# Patient Record
Sex: FEMALE | Race: WHITE | NOT HISPANIC OR LATINO | Employment: OTHER | URBAN - METROPOLITAN AREA
[De-identification: names, ages, dates, MRNs, and addresses within clinical notes are randomized per-mention and may not be internally consistent; named-entity substitution may affect disease eponyms.]

---

## 2023-08-09 LAB
ALANINE AMINOTRANSFERASE (SGPT) (U/L) IN SER/PLAS: 12 U/L (ref 7–45)
ALBUMIN (G/DL) IN SER/PLAS: 4.1 G/DL (ref 3.4–5)
ALKALINE PHOSPHATASE (U/L) IN SER/PLAS: 64 U/L (ref 33–136)
ANION GAP IN SER/PLAS: 13 MMOL/L (ref 10–20)
ASPARTATE AMINOTRANSFERASE (SGOT) (U/L) IN SER/PLAS: 19 U/L (ref 9–39)
BASOPHILS (10*3/UL) IN BLOOD BY AUTOMATED COUNT: 0.05 X10E9/L (ref 0–0.1)
BASOPHILS/100 LEUKOCYTES IN BLOOD BY AUTOMATED COUNT: 0.7 % (ref 0–2)
BILIRUBIN TOTAL (MG/DL) IN SER/PLAS: 0.7 MG/DL (ref 0–1.2)
CALCIUM (MG/DL) IN SER/PLAS: 9.8 MG/DL (ref 8.6–10.6)
CARBON DIOXIDE, TOTAL (MMOL/L) IN SER/PLAS: 29 MMOL/L (ref 21–32)
CHLORIDE (MMOL/L) IN SER/PLAS: 105 MMOL/L (ref 98–107)
CHOLESTEROL (MG/DL) IN SER/PLAS: 170 MG/DL (ref 0–199)
CHOLESTEROL IN HDL (MG/DL) IN SER/PLAS: 56.2 MG/DL
CHOLESTEROL/HDL RATIO: 3
CREATININE (MG/DL) IN SER/PLAS: 0.89 MG/DL (ref 0.5–1.05)
EOSINOPHILS (10*3/UL) IN BLOOD BY AUTOMATED COUNT: 0.25 X10E9/L (ref 0–0.7)
EOSINOPHILS/100 LEUKOCYTES IN BLOOD BY AUTOMATED COUNT: 3.4 % (ref 0–6)
ERYTHROCYTE DISTRIBUTION WIDTH (RATIO) BY AUTOMATED COUNT: 12.9 % (ref 11.5–14.5)
ERYTHROCYTE MEAN CORPUSCULAR HEMOGLOBIN CONCENTRATION (G/DL) BY AUTOMATED: 29.9 G/DL (ref 32–36)
ERYTHROCYTE MEAN CORPUSCULAR VOLUME (FL) BY AUTOMATED COUNT: 97 FL (ref 80–100)
ERYTHROCYTES (10*6/UL) IN BLOOD BY AUTOMATED COUNT: 4.07 X10E12/L (ref 4–5.2)
ESTIMATED AVERAGE GLUCOSE FOR HBA1C: 126 MG/DL
GFR FEMALE: 71 ML/MIN/1.73M2
GLUCOSE (MG/DL) IN SER/PLAS: 107 MG/DL (ref 74–99)
HEMATOCRIT (%) IN BLOOD BY AUTOMATED COUNT: 39.5 % (ref 36–46)
HEMOGLOBIN (G/DL) IN BLOOD: 11.8 G/DL (ref 12–16)
HEMOGLOBIN A1C/HEMOGLOBIN TOTAL IN BLOOD: 6 %
IMMATURE GRANULOCYTES/100 LEUKOCYTES IN BLOOD BY AUTOMATED COUNT: 0.3 % (ref 0–0.9)
LDL: 105 MG/DL (ref 0–99)
LEUKOCYTES (10*3/UL) IN BLOOD BY AUTOMATED COUNT: 7.3 X10E9/L (ref 4.4–11.3)
LYMPHOCYTES (10*3/UL) IN BLOOD BY AUTOMATED COUNT: 2.54 X10E9/L (ref 1.2–4.8)
LYMPHOCYTES/100 LEUKOCYTES IN BLOOD BY AUTOMATED COUNT: 34.6 % (ref 13–44)
MONOCYTES (10*3/UL) IN BLOOD BY AUTOMATED COUNT: 0.54 X10E9/L (ref 0.1–1)
MONOCYTES/100 LEUKOCYTES IN BLOOD BY AUTOMATED COUNT: 7.4 % (ref 2–10)
NEUTROPHILS (10*3/UL) IN BLOOD BY AUTOMATED COUNT: 3.94 X10E9/L (ref 1.2–7.7)
NEUTROPHILS/100 LEUKOCYTES IN BLOOD BY AUTOMATED COUNT: 53.6 % (ref 40–80)
NRBC (PER 100 WBCS) BY AUTOMATED COUNT: 0 /100 WBC (ref 0–0)
PLATELETS (10*3/UL) IN BLOOD AUTOMATED COUNT: 367 X10E9/L (ref 150–450)
POTASSIUM (MMOL/L) IN SER/PLAS: 4.1 MMOL/L (ref 3.5–5.3)
PROTEIN TOTAL: 6.7 G/DL (ref 6.4–8.2)
SODIUM (MMOL/L) IN SER/PLAS: 143 MMOL/L (ref 136–145)
TRIGLYCERIDE (MG/DL) IN SER/PLAS: 44 MG/DL (ref 0–149)
UREA NITROGEN (MG/DL) IN SER/PLAS: 20 MG/DL (ref 6–23)
VLDL: 9 MG/DL (ref 0–40)

## 2023-10-01 DIAGNOSIS — K44.9 DIAPHRAGMATIC HERNIA WITHOUT OBSTRUCTION OR GANGRENE: ICD-10-CM

## 2023-10-03 RX ORDER — PANTOPRAZOLE SODIUM 40 MG/1
TABLET, DELAYED RELEASE ORAL
Qty: 90 TABLET | Refills: 0 | Status: SHIPPED | OUTPATIENT
Start: 2023-10-03 | End: 2023-10-30 | Stop reason: SDUPTHER

## 2023-10-28 PROBLEM — R05.3 PERSISTENT COUGH: Status: ACTIVE | Noted: 2023-10-28

## 2023-10-28 PROBLEM — R13.14 DYSPHAGIA, PHARYNGOESOPHAGEAL PHASE: Status: ACTIVE | Noted: 2023-10-28

## 2023-10-28 PROBLEM — F41.8 DEPRESSION WITH ANXIETY: Status: ACTIVE | Noted: 2023-10-28

## 2023-10-28 PROBLEM — K44.9 HIATAL HERNIA: Status: ACTIVE | Noted: 2023-10-28

## 2023-10-28 PROBLEM — R73.09 ABNORMAL GLUCOSE: Status: ACTIVE | Noted: 2023-10-28

## 2023-10-28 PROBLEM — L98.9 SCALP LESION: Status: ACTIVE | Noted: 2023-10-28

## 2023-10-28 PROBLEM — K21.00 GERD WITH ESOPHAGITIS: Status: ACTIVE | Noted: 2023-10-28

## 2023-10-28 PROBLEM — R53.83 FATIGUE: Status: ACTIVE | Noted: 2023-10-28

## 2023-10-28 PROBLEM — H40.9 GLAUCOMA: Status: ACTIVE | Noted: 2023-10-28

## 2023-10-28 PROBLEM — M25.569 KNEE PAIN: Status: ACTIVE | Noted: 2023-10-28

## 2023-10-28 PROBLEM — E66.3 OVERWEIGHT (BMI 25.0-29.9): Status: ACTIVE | Noted: 2023-10-28

## 2023-10-28 PROBLEM — I10 BENIGN ESSENTIAL HYPERTENSION: Status: ACTIVE | Noted: 2023-10-28

## 2023-10-28 RX ORDER — DORZOLAMIDE HYDROCHLORIDE AND TIMOLOL MALEATE 20; 5 MG/ML; MG/ML
SOLUTION/ DROPS OPHTHALMIC
COMMUNITY
Start: 2023-09-25

## 2023-10-28 RX ORDER — FLUOXETINE HYDROCHLORIDE 20 MG/1
20 CAPSULE ORAL DAILY
COMMUNITY
Start: 2023-07-11

## 2023-10-28 RX ORDER — LATANOPROST 50 UG/ML
SOLUTION/ DROPS OPHTHALMIC
COMMUNITY
Start: 2021-03-23 | End: 2024-01-05 | Stop reason: ALTCHOICE

## 2023-10-28 RX ORDER — LATANOPROST 0.05 MG/ML
1 SOLUTION/ DROPS OPHTHALMIC; TOPICAL NIGHTLY
COMMUNITY
End: 2024-02-14 | Stop reason: WASHOUT

## 2023-10-28 RX ORDER — DORZOLAMIDE HYDROCHLORIDE AND TIMOLOL MALEATE PRESERVATIVE FREE 20; 5 MG/ML; MG/ML
1 SOLUTION/ DROPS OPHTHALMIC 2 TIMES DAILY
COMMUNITY
Start: 2017-08-18

## 2023-10-28 RX ORDER — TAFLUPROST OPTHALMIC 0 MG/.3ML
1 SOLUTION/ DROPS OPHTHALMIC EVERY EVENING
COMMUNITY
Start: 2018-01-31 | End: 2024-01-05 | Stop reason: ALTCHOICE

## 2023-10-28 RX ORDER — OFLOXACIN 3 MG/ML
SOLUTION/ DROPS OPHTHALMIC
COMMUNITY
Start: 2023-08-07 | End: 2024-01-05 | Stop reason: ALTCHOICE

## 2023-10-28 RX ORDER — AMLODIPINE AND BENAZEPRIL HYDROCHLORIDE 5; 20 MG/1; MG/1
1 CAPSULE ORAL DAILY
COMMUNITY
Start: 2019-04-24 | End: 2023-12-24

## 2023-10-28 RX ORDER — DESVENLAFAXINE SUCCINATE 50 MG/1
50 TABLET, EXTENDED RELEASE ORAL DAILY
COMMUNITY
Start: 2023-09-03

## 2023-10-28 RX ORDER — BROMFENAC SODIUM 0.7 MG/ML
SOLUTION/ DROPS OPHTHALMIC
COMMUNITY
Start: 2023-06-28

## 2023-10-28 RX ORDER — BESIFLOXACIN 6 MG/ML
SUSPENSION OPHTHALMIC
COMMUNITY
Start: 2023-06-28

## 2023-10-28 RX ORDER — DICLOFENAC SODIUM 1 MG/ML
SOLUTION/ DROPS OPHTHALMIC
COMMUNITY
Start: 2023-08-07

## 2023-10-28 RX ORDER — BENZONATATE 200 MG/1
200 CAPSULE ORAL
COMMUNITY
Start: 2023-01-04 | End: 2024-01-05 | Stop reason: ALTCHOICE

## 2023-10-28 RX ORDER — PREDNISOLONE ACETATE 10 MG/ML
SUSPENSION/ DROPS OPHTHALMIC
COMMUNITY
Start: 2023-08-07 | End: 2024-01-05 | Stop reason: ALTCHOICE

## 2023-10-30 ENCOUNTER — OFFICE VISIT (OUTPATIENT)
Dept: GASTROENTEROLOGY | Facility: CLINIC | Age: 68
End: 2023-10-30
Payer: MEDICARE

## 2023-10-30 VITALS — HEIGHT: 64 IN | WEIGHT: 156 LBS | BODY MASS INDEX: 26.63 KG/M2 | HEART RATE: 105 BPM | OXYGEN SATURATION: 97 %

## 2023-10-30 DIAGNOSIS — K21.00 GASTROESOPHAGEAL REFLUX DISEASE WITH ESOPHAGITIS WITHOUT HEMORRHAGE: Primary | ICD-10-CM

## 2023-10-30 DIAGNOSIS — K44.9 DIAPHRAGMATIC HERNIA WITHOUT OBSTRUCTION OR GANGRENE: ICD-10-CM

## 2023-10-30 DIAGNOSIS — Z12.11 COLON CANCER SCREENING: ICD-10-CM

## 2023-10-30 DIAGNOSIS — R05.3 PERSISTENT COUGH: ICD-10-CM

## 2023-10-30 PROCEDURE — 1159F MED LIST DOCD IN RCRD: CPT | Performed by: INTERNAL MEDICINE

## 2023-10-30 PROCEDURE — 1126F AMNT PAIN NOTED NONE PRSNT: CPT | Performed by: INTERNAL MEDICINE

## 2023-10-30 PROCEDURE — 99213 OFFICE O/P EST LOW 20 MIN: CPT | Performed by: INTERNAL MEDICINE

## 2023-10-30 RX ORDER — PANTOPRAZOLE SODIUM 40 MG/1
40 TABLET, DELAYED RELEASE ORAL
Qty: 180 TABLET | Refills: 1 | Status: SHIPPED | OUTPATIENT
Start: 2023-10-30 | End: 2024-04-03

## 2023-10-30 ASSESSMENT — ENCOUNTER SYMPTOMS
LOSS OF SENSATION IN FEET: 0
OCCASIONAL FEELINGS OF UNSTEADINESS: 0
DEPRESSION: 0

## 2023-10-30 NOTE — PROGRESS NOTES
Subjective     History of Present Illness:     67 yo with HTN, depression/anxiety, GERD seen in follow up.  Last seen 1 yr ago  for cough with PO intake with negative MBS and no additional sx.    EGD 9/2022 showed LA grade B esophagitis and 4 cm hiatal hernia.  She was started on pantoprazole 40 mg daily.  States pantoprazole helped a little. Still coughing quite a bit at times not reulated to meals.  Has history of asthma, only uses rescue inhaler but has not seen pulmonology in many years.  No dysphagia, odynophagia or globus sensation. No regurgitation  Denies heartburn, indigestion, nausea or epigastric pain. No increase in belching.  No rectal bleeding or melena  Bms regular.     Colonoscopy ([facility]) 5 y ago [place], was normal; per patient due for repeat colonoscpy per primary.   No reports available for review.     No family history of Gi issues.     Allergies  No Known Allergies    Medications       Objective   There were no vitals taken for this visit.   Physical Exam  Vitals reviewed.   Constitutional:       General: She is awake.   Cardiovascular:      Rate and Rhythm: Normal rate and regular rhythm.   Pulmonary:      Effort: Pulmonary effort is normal.      Breath sounds: Normal breath sounds.   Abdominal:      General: Bowel sounds are normal.      Palpations: Abdomen is soft.      Tenderness: There is no abdominal tenderness.   Neurological:      Mental Status: She is alert and oriented to person, place, and time.   Psychiatric:         Attention and Perception: Attention and perception normal.         Behavior: Behavior normal.             Asssessment/Plan:    67 yo with HTN, depression/anxiety, GERD seen in follow up. Mild improvement in cough on once daily PPI.  Esophagitis and hiatal hernia on EGD.  Will increase PPI to bid x 2 mo to assess for further improvement, however recommend pulmonary evaluation as well.  If no significant change on bid ppi will need objective pH testing to eavluate  reflux    Rec  Bid ppi x 2 mo  Recommend pulmonary eval  Colonoscopy scheduled    F/up in 2 mo       Ngoc Buck MD

## 2023-11-22 ENCOUNTER — APPOINTMENT (OUTPATIENT)
Dept: RADIOLOGY | Facility: CLINIC | Age: 68
End: 2023-11-22
Payer: MEDICARE

## 2023-12-05 ENCOUNTER — APPOINTMENT (OUTPATIENT)
Dept: RADIOLOGY | Facility: CLINIC | Age: 68
End: 2023-12-05
Payer: MEDICARE

## 2023-12-21 DIAGNOSIS — I10 ESSENTIAL (PRIMARY) HYPERTENSION: ICD-10-CM

## 2023-12-24 RX ORDER — AMLODIPINE AND BENAZEPRIL HYDROCHLORIDE 5; 20 MG/1; MG/1
1 CAPSULE ORAL DAILY
Qty: 90 CAPSULE | Refills: 1 | Status: SHIPPED | OUTPATIENT
Start: 2023-12-24 | End: 2024-02-14 | Stop reason: SDUPTHER

## 2024-01-04 ENCOUNTER — ANCILLARY PROCEDURE (OUTPATIENT)
Dept: RADIOLOGY | Facility: CLINIC | Age: 69
End: 2024-01-04
Payer: MEDICARE

## 2024-01-04 DIAGNOSIS — Z12.31 ENCOUNTER FOR SCREENING MAMMOGRAM FOR MALIGNANT NEOPLASM OF BREAST: ICD-10-CM

## 2024-01-04 PROCEDURE — 77067 SCR MAMMO BI INCL CAD: CPT | Mod: BILATERAL PROCEDURE | Performed by: RADIOLOGY

## 2024-01-04 PROCEDURE — 77067 SCR MAMMO BI INCL CAD: CPT

## 2024-01-04 PROCEDURE — 77063 BREAST TOMOSYNTHESIS BI: CPT | Mod: BILATERAL PROCEDURE | Performed by: RADIOLOGY

## 2024-01-05 ENCOUNTER — OFFICE VISIT (OUTPATIENT)
Dept: GASTROENTEROLOGY | Facility: CLINIC | Age: 69
End: 2024-01-05
Payer: MEDICARE

## 2024-01-05 VITALS — HEIGHT: 64 IN | WEIGHT: 149.4 LBS | BODY MASS INDEX: 25.51 KG/M2

## 2024-01-05 DIAGNOSIS — K21.00 GASTROESOPHAGEAL REFLUX DISEASE WITH ESOPHAGITIS WITHOUT HEMORRHAGE: ICD-10-CM

## 2024-01-05 DIAGNOSIS — K44.9 HIATAL HERNIA: Primary | ICD-10-CM

## 2024-01-05 PROCEDURE — 1159F MED LIST DOCD IN RCRD: CPT | Performed by: INTERNAL MEDICINE

## 2024-01-05 PROCEDURE — 1036F TOBACCO NON-USER: CPT | Performed by: INTERNAL MEDICINE

## 2024-01-05 PROCEDURE — 1126F AMNT PAIN NOTED NONE PRSNT: CPT | Performed by: INTERNAL MEDICINE

## 2024-01-05 PROCEDURE — 99213 OFFICE O/P EST LOW 20 MIN: CPT | Performed by: INTERNAL MEDICINE

## 2024-01-05 NOTE — PROGRESS NOTES
Subjective     History of Present Illness:   67 yo with HTN, depression/anxiety, GERD seen in follow up.  Last seen 10/30 . Had been on pantoprazole after EGD in 9/22 showed LA grade B esophagitis, 4 cm hiatal hernia.   Helped cough a little bit. Increased pantoprazole to 40 mg bid last visit and today reports resolution of cough. Has made significant difference on BID pantoprazole.  Denies dysphagia/odynophagia. No complaints.   Scheduled for surveillance colonoscopy next week.     EGD 9/2022 showed LA grade B esophagitis and 4 cm hiatal hernia.  Colonoscopy ([facility]) 5 y ago at [place], was normal; per patient due for repeat colonoscpy per primary.   No reports available for review.     No family history of Gi issues.       Allergies  No Known Allergies    Medications  Current Outpatient Medications   Medication Instructions    amLODIPine-benazepriL (Lotrel) 5-20 mg capsule 1 capsule, oral, Daily    benzonatate (TESSALON) 200 mg, oral, EVERY 8 HOURS FOR COUGH AS NEEDED    besifloxacin (Besivance) 0.6 % drops,suspension  Use 1 Drop in the left eye three times daily. STARTING AFTER SURGERY<BR>    bromfenac (Prolensa) 0.07 % drops  Use 1 Drop in the left eye once daily. STARTING AFTER SURGERY    desvenlafaxine (PRISTIQ) 50 mg, oral, Daily    diclofenac (Voltaren) 0.1 % ophthalmic solution     dorzolamide-timoloL (Cosopt) 22.3-6.8 mg/mL ophthalmic solution     dorzolamide-timolol, PF, (Cosopt) 2-0.5 % ophthalmic solution 1 drop, ophthalmic (eye), 2 times daily    FLUoxetine (PROZAC) 20 mg, oral, Daily    latanoprost (Xalatan) 0.005 % ophthalmic solution Latanoprost 0.005 % Ophthalmic Solution   Quantity: 8  Refills: 0        Start : 23-Mar-2021   Active    ofloxacin (Ocuflox) 0.3 % ophthalmic solution     pantoprazole (PROTONIX) 40 mg, oral, 2 times daily before meals, Do not crush, chew, or split.    prednisoLONE acetate (Pred-Forte) 1 % ophthalmic suspension     tafluprost, PF, (Zioptan, PF,) 0.0015 % dropperette 1  drop, ophthalmic (eye), Every evening    Xelpros 0.005 % drops, emulsion 1 drop, ophthalmic (eye), Nightly        Objective   There were no vitals taken for this visit.   Physical Exam          Assessment/Plan:    Expand All Collapse All       Subjective   History of Present Illness:      65 yo with HTN, depression/anxiety, GERD seen in follow up.  Last seen 1 yr ago  for cough with PO intake with negative MBS and no additional sx.    EGD 9/2022 showed LA grade B esophagitis and 4 cm hiatal hernia.  She was started on pantoprazole 40 mg daily.  States pantoprazole helped a little. Still coughing quite a bit at times not reulated to meals.  Has history of asthma, only uses rescue inhaler but has not seen pulmonology in many years.  No dysphagia, odynophagia or globus sensation. No regurgitation  Denies heartburn, indigestion, nausea or epigastric pain. No increase in belching.  No rectal bleeding or melena  Bms regular.     Colonoscopy ([facility]) 5 y ago at [place], was normal; per patient due for repeat colonoscpy per primary.   No reports available for review.     No family history of Gi issues.      Allergies  No Known Allergies     Medications              Objective   There were no vitals taken for this visit.   Physical Exam  Vitals reviewed.   Constitutional:       General: She is awake.   Cardiovascular:      Rate and Rhythm: Normal rate and regular rhythm.   Pulmonary:      Effort: Pulmonary effort is normal.      Breath sounds: Normal breath sounds.   Abdominal:      General: Bowel sounds are normal.      Palpations: Abdomen is soft.      Tenderness: There is no abdominal tenderness.   Neurological:      Mental Status: She is alert and oriented to person, place, and time.   Psychiatric:         Attention and Perception: Attention and perception normal.         Behavior: Behavior normal.                  Asssessment/Plan:     69 yo with HTN, depression/anxiety, GERD seen in follow up for gerd, chronic cough.  Esophagitis and hiatal hernia on EGD.  Increased PPI to BID and has had essentially resolution of cough confirming reflux as cause.  Encouraged to continue current dosing and reassured of safety of PPIs. Colonoscopy for surveillance next week.     Rec  Cont Bid ppi   Colonoscopy scheduled next week          Ngoc Buck MD

## 2024-01-12 ENCOUNTER — OFFICE VISIT (OUTPATIENT)
Dept: GASTROENTEROLOGY | Facility: EXTERNAL LOCATION | Age: 69
End: 2024-01-12
Payer: MEDICARE

## 2024-01-12 ENCOUNTER — LAB REQUISITION (OUTPATIENT)
Dept: LAB | Facility: HOSPITAL | Age: 69
End: 2024-01-12
Payer: MEDICARE

## 2024-01-12 DIAGNOSIS — Z12.11 COLON CANCER SCREENING: ICD-10-CM

## 2024-01-12 DIAGNOSIS — K57.30 DIVERTICULOSIS OF LARGE INTESTINE WITHOUT DIVERTICULITIS: Primary | ICD-10-CM

## 2024-01-12 DIAGNOSIS — D12.5 BENIGN NEOPLASM OF SIGMOID COLON: ICD-10-CM

## 2024-01-12 DIAGNOSIS — D12.3 BENIGN NEOPLASM OF TRANSVERSE COLON: ICD-10-CM

## 2024-01-12 PROCEDURE — 1159F MED LIST DOCD IN RCRD: CPT | Performed by: INTERNAL MEDICINE

## 2024-01-12 PROCEDURE — 88341 IMHCHEM/IMCYTCHM EA ADD ANTB: CPT

## 2024-01-12 PROCEDURE — 88305 TISSUE EXAM BY PATHOLOGIST: CPT

## 2024-01-12 PROCEDURE — 45385 COLONOSCOPY W/LESION REMOVAL: CPT | Performed by: INTERNAL MEDICINE

## 2024-01-12 PROCEDURE — 88305 TISSUE EXAM BY PATHOLOGIST: CPT | Performed by: PATHOLOGY

## 2024-01-12 PROCEDURE — 0753T DGTZ GLS MCRSCP SLD LEVEL IV: CPT

## 2024-01-12 PROCEDURE — 88342 IMHCHEM/IMCYTCHM 1ST ANTB: CPT | Performed by: PATHOLOGY

## 2024-01-12 PROCEDURE — 1126F AMNT PAIN NOTED NONE PRSNT: CPT | Performed by: INTERNAL MEDICINE

## 2024-01-12 PROCEDURE — 88342 IMHCHEM/IMCYTCHM 1ST ANTB: CPT

## 2024-01-12 PROCEDURE — 88341 IMHCHEM/IMCYTCHM EA ADD ANTB: CPT | Performed by: PATHOLOGY

## 2024-01-12 PROCEDURE — 1036F TOBACCO NON-USER: CPT | Performed by: INTERNAL MEDICINE

## 2024-01-12 PROCEDURE — 0760T DGTZ GLS MCRSCP SL IMM 1ST: CPT

## 2024-01-22 ENCOUNTER — CLINICAL SUPPORT (OUTPATIENT)
Dept: AUDIOLOGY | Facility: CLINIC | Age: 69
End: 2024-01-22
Payer: MEDICARE

## 2024-01-22 DIAGNOSIS — H90.3 SENSORINEURAL HEARING LOSS (SNHL) OF BOTH EARS: Primary | ICD-10-CM

## 2024-01-22 PROCEDURE — 92557 COMPREHENSIVE HEARING TEST: CPT | Performed by: AUDIOLOGIST

## 2024-01-22 PROCEDURE — 92550 TYMPANOMETRY & REFLEX THRESH: CPT | Performed by: AUDIOLOGIST

## 2024-01-22 ASSESSMENT — PAIN - FUNCTIONAL ASSESSMENT: PAIN_FUNCTIONAL_ASSESSMENT: 0-10

## 2024-01-22 ASSESSMENT — PAIN SCALES - GENERAL: PAINLEVEL_OUTOF10: 0 - NO PAIN

## 2024-01-22 NOTE — PROGRESS NOTES
AUDIOLOGY ADULT AUDIOMETRIC EVALUATION    Name:  Karli Ferrera  :  1955  Age:  68 y.o.  Date of Evaluation:  24  Time: 1430 - 1515    History:  Ms. Ferrera is seen today for audiogram and hearing aid check.  Last seen by me a little over a year ago for CI eval. She was not a CI candidate.    CNC words (55 dB HL in quiet)  Right: 78%  Left: 68%     AZBio sentences (55 dB HL in quiet)  Right: 96%  Left: 96%     AZBio sentences (55 dB HL in noise, +5 dB SNR, noise presented from front speaker)  Right: 30%  Left: 50%     I ended up dispensing new hearing aids at the time.  She found great benefit from them.    Phonak Crystaleo P50-R   Right s/n: [id number]  Left s/n: [id number]  Warranty expiration 3/4/2026     SlimTip, size 2 medium power .   Right s/n: [id number]  Left s/n: [id number]  Warranty expiration: 3/4/2023    Today  - would like hearing aids adjusted as she is struggling a little more to hear.  She has no concerns otherwise about the function of her devices.  - thinks her hearing is still stable  - No report of Otalgia, Otorrhea, Aural Fullness, Imbalance / Dizziness, Tinnitus, Noise Exposure, Previous ear surgeries    Recall (2022)    - Bilateral hearing loss with use of bilateral hearing aids the last 15 years. Has felt that her hearing / word understanding has gotten worse  - Uses Audibel TRICIA hearing aids fit by an outside clinic. Aids are 3 years old, per patient report. Did not have a past audiogram with her.   - Family hx of hearing loss (3/4 siblings with hearing difficulty and parents as well)  - She went to a educational seminar on cochlear implants sponsored by [employer] and led by Melissa Velazquez (she saw a flyer in the "[employer]," magazine) and was subsequently interested in learning more.   - No report of Otalgia, Otorrhea, Aural Fullness, Imbalance / Dizziness, Tinnitus, Noise Exposure, Previous ear surgeries    RESULTS:    Otoscopic Evaluation: clear bilaterally      Immittance Measures: 226 Hz    Tympanograms   - Right ear Normal middle ear pressure, normal compliance, and normal volume (Type A)  - Left ear: Normal middle ear pressure, normal compliance, and normal volume (Type A)    Acoustic reflexes (Ipsilateral)  - Right ear: [x] 500 Hz [x] 1000 Hz [] 2000 Hz [] 4000 Hz  - Left ear:   [] 500 Hz [] 1000 Hz [] 2000 Hz [] 4000 Hz.    Test technique:  Pure Tone Audiometry     Reliability:   good    Pure Tone Audiometry:  - moderate to moderately-severe sensorineural hearing loss bilaterally     Speech Audiometry:   - Right ear: 80 % at 95 dB HL, recorded NU6 words  - Left ear: 80 % at 95 dB HL, recorded NU6 words    HEARING AID CHECK  - cleaned and checked hearing aids  - updated hearing aid firmware  - ran feedback manager; enabled over-tuning; increased overall gain bilaterally.  Confirmed there was no feedback.   She was happy with adjustments.    IMPRESSIONS/RECOMMENDATIONS:  - Stable moderate to moderately-severe sensorineural hearing loss bilaterally   - Annual audiogram to monitor hearing, or sooner if there are concerns re: hearing. This was scheduled 1/20/2025.   - Reminded patient of hearing aid warranty period         Viral Patsy Luong, PhD  Audiologist /  of Otolaryngology    01/22/24        11/15/2022

## 2024-01-23 LAB
LAB AP ASR DISCLAIMER: NORMAL
LABORATORY COMMENT REPORT: NORMAL
PATH REPORT.FINAL DX SPEC: NORMAL
PATH REPORT.GROSS SPEC: NORMAL
PATH REPORT.RELEVANT HX SPEC: NORMAL
PATH REPORT.TOTAL CANCER: NORMAL

## 2024-02-01 ENCOUNTER — LAB (OUTPATIENT)
Dept: LAB | Facility: LAB | Age: 69
End: 2024-02-01
Payer: MEDICARE

## 2024-02-01 DIAGNOSIS — R73.09 OTHER ABNORMAL GLUCOSE: ICD-10-CM

## 2024-02-01 DIAGNOSIS — I10 ESSENTIAL (PRIMARY) HYPERTENSION: Primary | ICD-10-CM

## 2024-02-01 LAB
ALBUMIN SERPL BCP-MCNC: 4 G/DL (ref 3.4–5)
ALP SERPL-CCNC: 63 U/L (ref 33–136)
ALT SERPL W P-5'-P-CCNC: 12 U/L (ref 7–45)
ANION GAP SERPL CALC-SCNC: 11 MMOL/L (ref 10–20)
AST SERPL W P-5'-P-CCNC: 17 U/L (ref 9–39)
BASOPHILS # BLD AUTO: 0.05 X10*3/UL (ref 0–0.1)
BASOPHILS NFR BLD AUTO: 0.7 %
BILIRUB SERPL-MCNC: 0.5 MG/DL (ref 0–1.2)
BUN SERPL-MCNC: 17 MG/DL (ref 6–23)
CALCIUM SERPL-MCNC: 9.6 MG/DL (ref 8.6–10.6)
CHLORIDE SERPL-SCNC: 106 MMOL/L (ref 98–107)
CO2 SERPL-SCNC: 30 MMOL/L (ref 21–32)
CREAT SERPL-MCNC: 0.95 MG/DL (ref 0.5–1.05)
EGFRCR SERPLBLD CKD-EPI 2021: 65 ML/MIN/1.73M*2
EOSINOPHIL # BLD AUTO: 0.26 X10*3/UL (ref 0–0.7)
EOSINOPHIL NFR BLD AUTO: 3.6 %
ERYTHROCYTE [DISTWIDTH] IN BLOOD BY AUTOMATED COUNT: 12.6 % (ref 11.5–14.5)
EST. AVERAGE GLUCOSE BLD GHB EST-MCNC: 123 MG/DL
GLUCOSE SERPL-MCNC: 95 MG/DL (ref 74–99)
HBA1C MFR BLD: 5.9 %
HCT VFR BLD AUTO: 39 % (ref 36–46)
HGB BLD-MCNC: 12 G/DL (ref 12–16)
IMM GRANULOCYTES # BLD AUTO: 0.02 X10*3/UL (ref 0–0.7)
IMM GRANULOCYTES NFR BLD AUTO: 0.3 % (ref 0–0.9)
LYMPHOCYTES # BLD AUTO: 2.38 X10*3/UL (ref 1.2–4.8)
LYMPHOCYTES NFR BLD AUTO: 33 %
MCH RBC QN AUTO: 29.2 PG (ref 26–34)
MCHC RBC AUTO-ENTMCNC: 30.8 G/DL (ref 32–36)
MCV RBC AUTO: 95 FL (ref 80–100)
MONOCYTES # BLD AUTO: 0.48 X10*3/UL (ref 0.1–1)
MONOCYTES NFR BLD AUTO: 6.6 %
NEUTROPHILS # BLD AUTO: 4.03 X10*3/UL (ref 1.2–7.7)
NEUTROPHILS NFR BLD AUTO: 55.8 %
NRBC BLD-RTO: 0 /100 WBCS (ref 0–0)
PLATELET # BLD AUTO: 351 X10*3/UL (ref 150–450)
POTASSIUM SERPL-SCNC: 4.2 MMOL/L (ref 3.5–5.3)
PROT SERPL-MCNC: 6.6 G/DL (ref 6.4–8.2)
RBC # BLD AUTO: 4.11 X10*6/UL (ref 4–5.2)
SODIUM SERPL-SCNC: 143 MMOL/L (ref 136–145)
WBC # BLD AUTO: 7.2 X10*3/UL (ref 4.4–11.3)

## 2024-02-01 PROCEDURE — 80053 COMPREHEN METABOLIC PANEL: CPT

## 2024-02-01 PROCEDURE — 85025 COMPLETE CBC W/AUTO DIFF WBC: CPT

## 2024-02-01 PROCEDURE — 83036 HEMOGLOBIN GLYCOSYLATED A1C: CPT

## 2024-02-01 PROCEDURE — 36415 COLL VENOUS BLD VENIPUNCTURE: CPT

## 2024-02-08 ENCOUNTER — OFFICE VISIT (OUTPATIENT)
Dept: OBSTETRICS AND GYNECOLOGY | Facility: CLINIC | Age: 69
End: 2024-02-08
Payer: MEDICARE

## 2024-02-08 VITALS
WEIGHT: 150 LBS | HEIGHT: 65 IN | BODY MASS INDEX: 24.99 KG/M2 | DIASTOLIC BLOOD PRESSURE: 78 MMHG | SYSTOLIC BLOOD PRESSURE: 153 MMHG

## 2024-02-08 DIAGNOSIS — Z01.419 NORMAL GYNECOLOGIC EXAMINATION: ICD-10-CM

## 2024-02-08 DIAGNOSIS — Z01.419 ENCOUNTER FOR WELL WOMAN EXAM WITH ROUTINE GYNECOLOGICAL EXAM: Primary | ICD-10-CM

## 2024-02-08 PROCEDURE — 88142 CYTOPATH C/V THIN LAYER: CPT

## 2024-02-08 PROCEDURE — 1159F MED LIST DOCD IN RCRD: CPT | Performed by: OBSTETRICS & GYNECOLOGY

## 2024-02-08 PROCEDURE — 87624 HPV HI-RISK TYP POOLED RSLT: CPT

## 2024-02-08 PROCEDURE — 1126F AMNT PAIN NOTED NONE PRSNT: CPT | Performed by: OBSTETRICS & GYNECOLOGY

## 2024-02-08 PROCEDURE — 1160F RVW MEDS BY RX/DR IN RCRD: CPT | Performed by: OBSTETRICS & GYNECOLOGY

## 2024-02-08 PROCEDURE — 1036F TOBACCO NON-USER: CPT | Performed by: OBSTETRICS & GYNECOLOGY

## 2024-02-08 PROCEDURE — 99397 PER PM REEVAL EST PAT 65+ YR: CPT | Performed by: OBSTETRICS & GYNECOLOGY

## 2024-02-08 PROCEDURE — 3077F SYST BP >= 140 MM HG: CPT | Performed by: OBSTETRICS & GYNECOLOGY

## 2024-02-08 PROCEDURE — 3078F DIAST BP <80 MM HG: CPT | Performed by: OBSTETRICS & GYNECOLOGY

## 2024-02-08 ASSESSMENT — ENCOUNTER SYMPTOMS
HEMATOLOGIC/LYMPHATIC NEGATIVE: 1
ENDOCRINE NEGATIVE: 1
CARDIOVASCULAR NEGATIVE: 1
CONSTITUTIONAL NEGATIVE: 1
PSYCHIATRIC NEGATIVE: 1
RESPIRATORY NEGATIVE: 1
ALLERGIC/IMMUNOLOGIC NEGATIVE: 1
EYES NEGATIVE: 1
NEUROLOGICAL NEGATIVE: 1
GASTROINTESTINAL NEGATIVE: 1
MUSCULOSKELETAL NEGATIVE: 1

## 2024-02-08 ASSESSMENT — PAIN SCALES - GENERAL: PAINLEVEL: 0-NO PAIN

## 2024-02-08 NOTE — PROGRESS NOTES
Subjective   Patient ID: Karli Ferrera is a 68 y.o. female who presents for Annual Exam (Last pap:  (age)/Last chloe:  8/10/2023  cat 1/Last colon screen:  1/12/2024/[facility].   Emilie Carl LPN).  HPI patient is here for annual visit she has no complaints    Review of Systems   Constitutional: Negative.    Eyes: Negative.    Respiratory: Negative.     Cardiovascular: Negative.    Gastrointestinal: Negative.    Endocrine: Negative.    Genitourinary: Negative.    Musculoskeletal: Negative.    Skin: Negative.    Allergic/Immunologic: Negative.    Neurological: Negative.    Hematological: Negative.    Psychiatric/Behavioral: Negative.         Objective   Physical Exam  Constitutional:       Appearance: Normal appearance.   HENT:      Head: Normocephalic and atraumatic.   Cardiovascular:      Rate and Rhythm: Normal rate and regular rhythm.      Pulses: Normal pulses.      Heart sounds: Normal heart sounds.   Pulmonary:      Effort: Pulmonary effort is normal.      Breath sounds: Normal breath sounds.   Abdominal:      General: Abdomen is flat. Bowel sounds are normal.      Palpations: Abdomen is soft.      Hernia: There is no hernia in the left inguinal area or right inguinal area.   Genitourinary:     General: Normal vulva.      Exam position: Lithotomy position.      Labia:         Right: No rash, tenderness or lesion.         Left: No rash, tenderness or lesion.       Urethra: No prolapse.      Vagina: Normal.      Cervix: Normal.      Uterus: Normal.       Adnexa: Right adnexa normal and left adnexa normal.   Musculoskeletal:      Cervical back: Normal range of motion and neck supple.   Skin:     General: Skin is warm and dry.   Neurological:      General: No focal deficit present.      Mental Status: She is alert and oriented to person, place, and time.         Assessment/Plan    normal gynecologic examination  Pap test HPV typing       Jesse Reddy MD 02/08/24 5:12 PM

## 2024-02-14 ENCOUNTER — OFFICE VISIT (OUTPATIENT)
Dept: PRIMARY CARE | Facility: CLINIC | Age: 69
End: 2024-02-14
Payer: MEDICARE

## 2024-02-14 VITALS
SYSTOLIC BLOOD PRESSURE: 160 MMHG | DIASTOLIC BLOOD PRESSURE: 77 MMHG | HEART RATE: 91 BPM | BODY MASS INDEX: 25.67 KG/M2 | HEIGHT: 64 IN | TEMPERATURE: 97.1 F | OXYGEN SATURATION: 95 % | WEIGHT: 150.35 LBS | RESPIRATION RATE: 16 BRPM

## 2024-02-14 DIAGNOSIS — R73.09 ABNORMAL GLUCOSE: ICD-10-CM

## 2024-02-14 DIAGNOSIS — Z00.00 MEDICARE ANNUAL WELLNESS VISIT, SUBSEQUENT: Primary | ICD-10-CM

## 2024-02-14 DIAGNOSIS — I10 BENIGN ESSENTIAL HYPERTENSION: ICD-10-CM

## 2024-02-14 DIAGNOSIS — I10 ESSENTIAL (PRIMARY) HYPERTENSION: ICD-10-CM

## 2024-02-14 PROCEDURE — 1126F AMNT PAIN NOTED NONE PRSNT: CPT | Performed by: INTERNAL MEDICINE

## 2024-02-14 PROCEDURE — 1036F TOBACCO NON-USER: CPT | Performed by: INTERNAL MEDICINE

## 2024-02-14 PROCEDURE — 99213 OFFICE O/P EST LOW 20 MIN: CPT | Performed by: INTERNAL MEDICINE

## 2024-02-14 PROCEDURE — 99397 PER PM REEVAL EST PAT 65+ YR: CPT | Performed by: INTERNAL MEDICINE

## 2024-02-14 PROCEDURE — 1159F MED LIST DOCD IN RCRD: CPT | Performed by: INTERNAL MEDICINE

## 2024-02-14 PROCEDURE — 1160F RVW MEDS BY RX/DR IN RCRD: CPT | Performed by: INTERNAL MEDICINE

## 2024-02-14 PROCEDURE — 1170F FXNL STATUS ASSESSED: CPT | Performed by: INTERNAL MEDICINE

## 2024-02-14 PROCEDURE — 3078F DIAST BP <80 MM HG: CPT | Performed by: INTERNAL MEDICINE

## 2024-02-14 PROCEDURE — 3077F SYST BP >= 140 MM HG: CPT | Performed by: INTERNAL MEDICINE

## 2024-02-14 RX ORDER — AMLODIPINE AND BENAZEPRIL HYDROCHLORIDE 5; 20 MG/1; MG/1
1 CAPSULE ORAL DAILY
Qty: 90 CAPSULE | Refills: 1 | Status: SHIPPED | OUTPATIENT
Start: 2024-02-14

## 2024-02-14 ASSESSMENT — ACTIVITIES OF DAILY LIVING (ADL)
MANAGING_FINANCES: INDEPENDENT
DOING_HOUSEWORK: INDEPENDENT
GROCERY_SHOPPING: INDEPENDENT
DRESSING: INDEPENDENT
BATHING: INDEPENDENT
TAKING_MEDICATION: INDEPENDENT

## 2024-02-14 ASSESSMENT — ENCOUNTER SYMPTOMS
APPETITE CHANGE: 0
DIZZINESS: 0
LOSS OF SENSATION IN FEET: 0
SEIZURES: 0
NUMBNESS: 0
FATIGUE: 0
SPEECH DIFFICULTY: 0
CHILLS: 0
FLANK PAIN: 0
ARTHRALGIAS: 0
POLYPHAGIA: 0
OCCASIONAL FEELINGS OF UNSTEADINESS: 0
BRUISES/BLEEDS EASILY: 0
HEADACHES: 0
TREMORS: 0
DEPRESSION: 0
HEMATURIA: 0
DIAPHORESIS: 0
PALPITATIONS: 0
STRIDOR: 0
WEAKNESS: 0
PHOTOPHOBIA: 0
BACK PAIN: 0
COUGH: 0
DYSURIA: 0
ADENOPATHY: 0

## 2024-02-14 ASSESSMENT — PATIENT HEALTH QUESTIONNAIRE - PHQ9
SUM OF ALL RESPONSES TO PHQ9 QUESTIONS 1 AND 2: 0
1. LITTLE INTEREST OR PLEASURE IN DOING THINGS: NOT AT ALL
2. FEELING DOWN, DEPRESSED OR HOPELESS: NOT AT ALL

## 2024-02-14 ASSESSMENT — PAIN SCALES - GENERAL: PAINLEVEL: 0-NO PAIN

## 2024-02-14 NOTE — PATIENT INSTRUCTIONS
Advised compliance with antihypertensive medication.   the prescription from pharmacy was sent today.  Follow low-sodium diet.  Follow-up in 6 months

## 2024-02-14 NOTE — ASSESSMENT & PLAN NOTE
Hypertension : not  controlled blood pressure. Out of medication. Continues same medication and educate a low salt diet do not exceed 200 mg per serving. Keep monitor the blood pressure at home. Refill medication.

## 2024-02-14 NOTE — ASSESSMENT & PLAN NOTE
No recent hospitalizations.    All medications reviewed and reconciled by me the provider..  No use of controlled substances or opiates.    Family history, social history reviewed, no changes.    Patient does not smoke.    Patient does not drink.    Patient hydrates adequately daily.  Eats a well-balanced healthy diet.     Does not exercise regularly.    Patient denies any difficulty with memory or cognition.     Hearing loss, she has hearing aids    No fall risk.  No recent falls.  Denies any difficulty walking.    Patient with no history of depression anxiety, denies any loss of interest, no feeling of sadness, no lack of motivation.    Patient is independent in all ADLs and IADLs.  Independent bathing, dressing, walking.  Takes care of own finances, shopping and cooking.     End-of-life decision-making power of  reviewed with patient.

## 2024-02-14 NOTE — PROGRESS NOTES
"Subjective   Patient ID: Karli Ferrera is a 68 y.o. female who presents for Medicare Annual Wellness Visit Subsequent.    Subsequent subsequent Medicare wellness visit.  She has hypertension, history of depression, prediabetes, GERD.  She has had bilateral eyes cataract surgery in December.  She is out of antihypertensive medication for the past 3 days.  Blood pressure is high today.  She has had blood work and results were reviewed.         Review of Systems   Constitutional:  Negative for appetite change, chills, diaphoresis and fatigue.   HENT:  Negative for congestion, ear discharge, hearing loss, mouth sores and postnasal drip.    Eyes:  Negative for photophobia and visual disturbance.   Respiratory:  Negative for cough and stridor.    Cardiovascular:  Negative for palpitations and leg swelling.   Endocrine: Negative for cold intolerance, heat intolerance, polyphagia and polyuria.   Genitourinary:  Negative for decreased urine volume, dysuria, enuresis, flank pain and hematuria.   Musculoskeletal:  Negative for arthralgias, back pain and gait problem.   Allergic/Immunologic: Negative for food allergies and immunocompromised state.   Neurological:  Negative for dizziness, tremors, seizures, syncope, speech difficulty, weakness, numbness and headaches.   Hematological:  Negative for adenopathy. Does not bruise/bleed easily.       Objective   /77 (BP Location: Left arm, Patient Position: Sitting)   Pulse 91   Temp 36.2 °C (97.1 °F) (Temporal)   Resp 16   Ht 1.621 m (5' 3.82\")   Wt 68.2 kg (150 lb 5.7 oz)   SpO2 95%   BMI 25.95 kg/m²     Physical Exam  Constitutional:       Appearance: Normal appearance.   HENT:      Head: Normocephalic and atraumatic.      Right Ear: External ear normal.      Left Ear: External ear normal.      Mouth/Throat:      Mouth: Mucous membranes are moist.      Pharynx: Oropharynx is clear.   Neck:      Vascular: No carotid bruit.   Cardiovascular:      Rate and Rhythm: " Normal rate and regular rhythm.      Heart sounds: No murmur heard.     No gallop.   Pulmonary:      Effort: Pulmonary effort is normal. No respiratory distress.      Breath sounds: No wheezing or rales.   Abdominal:      General: Abdomen is flat.      Palpations: Abdomen is soft.      Hernia: No hernia is present.   Musculoskeletal:         General: No swelling, tenderness, deformity or signs of injury.      Cervical back: No rigidity or tenderness.      Right lower leg: No edema.   Lymphadenopathy:      Cervical: No cervical adenopathy.   Skin:     Coloration: Skin is not jaundiced or pale.      Findings: No bruising, erythema, lesion or rash.   Neurological:      General: No focal deficit present.      Mental Status: She is oriented to person, place, and time.      Motor: No weakness.      Coordination: Coordination normal.   Psychiatric:         Mood and Affect: Mood normal.         Behavior: Behavior normal.         Assessment/Plan   Problem List Items Addressed This Visit             ICD-10-CM    Abnormal glucose R73.09     HbA1c 5.9. Avoid sweets.         Benign essential hypertension I10     Hypertension : not  controlled blood pressure. Out of medication. Continues same medication and educate a low salt diet do not exceed 200 mg per serving. Keep monitor the blood pressure at home. Refill medication.           Medicare annual wellness visit, subsequent - Primary Z00.00     No recent hospitalizations.    All medications reviewed and reconciled by me the provider..  No use of controlled substances or opiates.    Family history, social history reviewed, no changes.    Patient does not smoke.    Patient does not drink.    Patient hydrates adequately daily.  Eats a well-balanced healthy diet.     Does not exercise regularly.    Patient denies any difficulty with memory or cognition.     Hearing loss, she has hearing aids    No fall risk.  No recent falls.  Denies any difficulty walking.    Patient with no  history of depression anxiety, denies any loss of interest, no feeling of sadness, no lack of motivation.    Patient is independent in all ADLs and IADLs.  Independent bathing, dressing, walking.  Takes care of own finances, shopping and cooking.     End-of-life decision-making power of  reviewed with patient.            Other Visit Diagnoses         Codes    Essential (primary) hypertension     I10    Relevant Medications    amLODIPine-benazepriL (Lotrel) 5-20 mg capsule

## 2024-02-25 LAB
CYTOLOGY CMNT CVX/VAG CYTO-IMP: NORMAL
HPV HR 12 DNA GENITAL QL NAA+PROBE: NEGATIVE
HPV HR GENOTYPES PNL CVX NAA+PROBE: NEGATIVE
HPV16 DNA SPEC QL NAA+PROBE: NEGATIVE
HPV18 DNA SPEC QL NAA+PROBE: NEGATIVE
LAB AP HPV GENOTYPE QUESTION: YES
LAB AP HPV HR: NORMAL
LABORATORY COMMENT REPORT: NORMAL
LABORATORY COMMENT REPORT: NORMAL
MENSTRUAL HX REPORTED: NORMAL
PATH REPORT.TOTAL CANCER: NORMAL

## 2024-04-03 DIAGNOSIS — K44.9 DIAPHRAGMATIC HERNIA WITHOUT OBSTRUCTION OR GANGRENE: ICD-10-CM

## 2024-04-03 RX ORDER — PANTOPRAZOLE SODIUM 40 MG/1
40 TABLET, DELAYED RELEASE ORAL
Qty: 180 TABLET | Refills: 1 | Status: SHIPPED | OUTPATIENT
Start: 2024-04-03

## 2024-08-13 DIAGNOSIS — K44.9 DIAPHRAGMATIC HERNIA WITHOUT OBSTRUCTION OR GANGRENE: ICD-10-CM

## 2024-08-13 RX ORDER — PANTOPRAZOLE SODIUM 40 MG/1
40 TABLET, DELAYED RELEASE ORAL
Qty: 180 TABLET | Refills: 0 | Status: SHIPPED | OUTPATIENT
Start: 2024-08-13

## 2024-08-15 ENCOUNTER — APPOINTMENT (OUTPATIENT)
Dept: PRIMARY CARE | Facility: CLINIC | Age: 69
End: 2024-08-15
Payer: MEDICARE

## 2024-08-15 VITALS
BODY MASS INDEX: 27.36 KG/M2 | SYSTOLIC BLOOD PRESSURE: 120 MMHG | DIASTOLIC BLOOD PRESSURE: 80 MMHG | RESPIRATION RATE: 16 BRPM | OXYGEN SATURATION: 96 % | HEART RATE: 90 BPM | TEMPERATURE: 97.3 F | WEIGHT: 158.51 LBS

## 2024-08-15 DIAGNOSIS — I10 BENIGN ESSENTIAL HYPERTENSION: Primary | ICD-10-CM

## 2024-08-15 DIAGNOSIS — R73.09 ABNORMAL GLUCOSE: ICD-10-CM

## 2024-08-15 DIAGNOSIS — Z12.31 ENCOUNTER FOR SCREENING MAMMOGRAM FOR BREAST CANCER: ICD-10-CM

## 2024-08-15 DIAGNOSIS — Z78.0 ASYMPTOMATIC MENOPAUSAL STATE: ICD-10-CM

## 2024-08-15 DIAGNOSIS — Z00.00 MEDICARE ANNUAL WELLNESS VISIT, SUBSEQUENT: ICD-10-CM

## 2024-08-15 PROCEDURE — 1160F RVW MEDS BY RX/DR IN RCRD: CPT | Performed by: INTERNAL MEDICINE

## 2024-08-15 PROCEDURE — 3079F DIAST BP 80-89 MM HG: CPT | Performed by: INTERNAL MEDICINE

## 2024-08-15 PROCEDURE — 99213 OFFICE O/P EST LOW 20 MIN: CPT | Performed by: INTERNAL MEDICINE

## 2024-08-15 PROCEDURE — 3074F SYST BP LT 130 MM HG: CPT | Performed by: INTERNAL MEDICINE

## 2024-08-15 PROCEDURE — 1159F MED LIST DOCD IN RCRD: CPT | Performed by: INTERNAL MEDICINE

## 2024-08-15 PROCEDURE — 1036F TOBACCO NON-USER: CPT | Performed by: INTERNAL MEDICINE

## 2024-08-15 PROCEDURE — 1126F AMNT PAIN NOTED NONE PRSNT: CPT | Performed by: INTERNAL MEDICINE

## 2024-08-15 PROCEDURE — 1123F ACP DISCUSS/DSCN MKR DOCD: CPT | Performed by: INTERNAL MEDICINE

## 2024-08-15 RX ORDER — ALPRAZOLAM 0.5 MG/1
TABLET ORAL
COMMUNITY
Start: 2024-04-09

## 2024-08-15 RX ORDER — ERYTHROMYCIN 5 MG/G
1 OINTMENT OPHTHALMIC 4 TIMES DAILY
COMMUNITY
Start: 2024-04-25 | End: 2024-08-15 | Stop reason: ALTCHOICE

## 2024-08-15 ASSESSMENT — ENCOUNTER SYMPTOMS
OCCASIONAL FEELINGS OF UNSTEADINESS: 0
WEAKNESS: 0
TREMORS: 0
ARTHRALGIAS: 0
BACK PAIN: 0
APPETITE CHANGE: 0
DIZZINESS: 0
LOSS OF SENSATION IN FEET: 0
STRIDOR: 0
HEADACHES: 0
CHILLS: 0
HYPERTENSION: 1
PALPITATIONS: 0
SEIZURES: 0
NUMBNESS: 0
DYSURIA: 0
FLANK PAIN: 0
POLYPHAGIA: 0
FATIGUE: 0
COUGH: 0
DEPRESSION: 0
ADENOPATHY: 0
HEMATURIA: 0
DIAPHORESIS: 0
PHOTOPHOBIA: 0
BRUISES/BLEEDS EASILY: 0
SPEECH DIFFICULTY: 0

## 2024-08-15 ASSESSMENT — PATIENT HEALTH QUESTIONNAIRE - PHQ9
2. FEELING DOWN, DEPRESSED OR HOPELESS: NOT AT ALL
1. LITTLE INTEREST OR PLEASURE IN DOING THINGS: NOT AT ALL
SUM OF ALL RESPONSES TO PHQ9 QUESTIONS 1 AND 2: 0

## 2024-08-15 ASSESSMENT — PAIN SCALES - GENERAL: PAINLEVEL: 0-NO PAIN

## 2024-08-15 NOTE — ASSESSMENT & PLAN NOTE
Hypertension : controlled blood pressure , continue same medication and educate a low salt diet do not exceed 200 mg per serving. Keep monitor the blood pressure at home.

## 2024-08-15 NOTE — ASSESSMENT & PLAN NOTE
Please hemoglobin A1c 5.9.  Made aware of risk of diabetes.  Follow low carbohydrate diet avoid rice potatoes pasta sweets.  Check hemoglobin A1c before next visit.

## 2024-08-15 NOTE — PROGRESS NOTES
Subjective   Patient ID: Karli Ferrera is a 68 y.o. female who presents for Hypertension.    Follow-up visit.  She has hypertension depression with anxiety, GERD glaucoma, abnormal fasting glucose.  She feels well has no complaints.    Hypertension  Pertinent negatives include no headaches or palpitations.        Review of Systems   Constitutional:  Negative for appetite change, chills, diaphoresis and fatigue.   HENT:  Positive for hearing loss. Negative for congestion, ear discharge, mouth sores and postnasal drip.    Eyes:  Negative for photophobia and visual disturbance.   Respiratory:  Negative for cough and stridor.    Cardiovascular:  Negative for palpitations and leg swelling.   Endocrine: Negative for cold intolerance, heat intolerance, polyphagia and polyuria.   Genitourinary:  Negative for decreased urine volume, dysuria, enuresis, flank pain and hematuria.   Musculoskeletal:  Negative for arthralgias, back pain and gait problem.   Allergic/Immunologic: Negative for food allergies and immunocompromised state.   Neurological:  Negative for dizziness, tremors, seizures, syncope, speech difficulty, weakness, numbness and headaches.   Hematological:  Negative for adenopathy. Does not bruise/bleed easily.       Objective   /80 (BP Location: Left arm, Patient Position: Sitting)   Pulse 90   Temp 36.3 °C (97.3 °F) (Temporal)   Resp 16   Wt 71.9 kg (158 lb 8.2 oz)   SpO2 96%   BMI 27.36 kg/m²     Physical Exam  Constitutional:       Appearance: Normal appearance.   HENT:      Head: Normocephalic and atraumatic.      Comments: Hearing aids.     Right Ear: External ear normal.      Left Ear: External ear normal.      Mouth/Throat:      Mouth: Mucous membranes are moist.      Pharynx: Oropharynx is clear.   Neck:      Vascular: No carotid bruit.   Cardiovascular:      Rate and Rhythm: Normal rate and regular rhythm.      Heart sounds: No murmur heard.     No gallop.   Pulmonary:      Effort: Pulmonary  effort is normal. No respiratory distress.      Breath sounds: No wheezing or rales.   Abdominal:      General: Abdomen is flat.      Palpations: Abdomen is soft.      Hernia: No hernia is present.   Musculoskeletal:         General: No swelling, tenderness, deformity or signs of injury.      Cervical back: No rigidity or tenderness.      Right lower leg: No edema.   Lymphadenopathy:      Cervical: No cervical adenopathy.   Skin:     Coloration: Skin is not jaundiced or pale.      Findings: No bruising, erythema, lesion or rash.   Neurological:      General: No focal deficit present.      Mental Status: She is oriented to person, place, and time.      Motor: No weakness.      Coordination: Coordination normal.   Psychiatric:         Mood and Affect: Mood normal.         Behavior: Behavior normal.         Assessment/Plan   Problem List Items Addressed This Visit             ICD-10-CM    Abnormal glucose R73.09     Please hemoglobin A1c 5.9.  Made aware of risk of diabetes.  Follow low carbohydrate diet avoid rice potatoes pasta sweets.  Check hemoglobin A1c before next visit.         Benign essential hypertension - Primary I10     Hypertension : controlled blood pressure , continue same medication and educate a low salt diet do not exceed 200 mg per serving. Keep monitor the blood pressure at home.           Medicare annual wellness visit, subsequent Z00.00    Relevant Orders    Follow Up In Primary Care - Medicare Annual     Other Visit Diagnoses         Codes    Asymptomatic menopausal state     Z78.0    Relevant Orders    XR DEXA bone density    Encounter for screening mammogram for breast cancer     Z12.31    Relevant Orders    BI mammo bilateral screening tomosynthesis

## 2024-09-16 ENCOUNTER — CLINICAL SUPPORT (OUTPATIENT)
Dept: AUDIOLOGY | Facility: CLINIC | Age: 69
End: 2024-09-16
Payer: MEDICARE

## 2024-09-16 DIAGNOSIS — H90.3 SENSORINEURAL HEARING LOSS (SNHL) OF BOTH EARS: Primary | ICD-10-CM

## 2024-09-16 PROCEDURE — V5299 HEARING SERVICE: HCPCS | Mod: AUDSP

## 2024-09-16 NOTE — PROGRESS NOTES
HEARING AID CHECK    Karli Ferrera was seen today for a hearing aid check. Patient reports that she recently traveled and lost her hearing aid  block. She has tried to use other  blocks, but they only charged the devices up for one day and then stopped working. She reports that her devices and  are otherwise working fine.    Previous audiologic testing completed on 1/22/24 showed moderate to moderately-severe sensorineural hearing loss with good word recognition bilaterally.     HEARING AID INFORMATION     Right Left   Date of Fitting 1/16/2023 1/16/2023   [facility]    Phonak Phonak   Model Audeo P50-R Audeo P50-R   Serial Number [id number]   Style TRICIA TRICIA   Warranty (Repair & L/D) 3/4/2026 3/4/2026   /Dome 2MP, slim tip earmold (SN: [id number], warranty exp: 3/4/23) 2MP, slip tip earmold (SN: [id number], warranty exp: 3/4/23)       ACTIONS TAKEN:  Cleaning:  brushed microphone ports, brushed  ports on earmolds. Wax guards appear free of debris. Devices were charged slightly today during cleaning.  Listening check= good binaural  Provided patient with new charging block from spare clinic stock. A new block was requested from [facility] to refill clinic stock.    RECOMMENDATIONS:  -Hearing aids provided back to patient in good working condition.  -Full time use of hearing aids during all waking and dry hours.  -Return as needed for hearing aid checks.  -Annual hearing evaluation.      Patient billed minor hearing aid fee in EPIC. Charge ticket was faxed.       Patsy Carvalho, CCC-A  Clinical Audiologist    Time: [id number]

## 2024-10-25 ENCOUNTER — HOSPITAL ENCOUNTER (OUTPATIENT)
Dept: RADIOLOGY | Facility: CLINIC | Age: 69
Discharge: HOME | End: 2024-10-25
Payer: MEDICARE

## 2024-10-25 DIAGNOSIS — Z78.0 ASYMPTOMATIC MENOPAUSAL STATE: ICD-10-CM

## 2024-10-25 PROCEDURE — 77080 DXA BONE DENSITY AXIAL: CPT

## 2024-11-08 DIAGNOSIS — I10 ESSENTIAL (PRIMARY) HYPERTENSION: ICD-10-CM

## 2024-11-08 RX ORDER — AMLODIPINE AND BENAZEPRIL HYDROCHLORIDE 5; 20 MG/1; MG/1
1 CAPSULE ORAL DAILY
Qty: 90 CAPSULE | Refills: 1 | Status: SHIPPED | OUTPATIENT
Start: 2024-11-08

## 2024-12-08 DIAGNOSIS — K44.9 DIAPHRAGMATIC HERNIA WITHOUT OBSTRUCTION OR GANGRENE: ICD-10-CM

## 2024-12-09 RX ORDER — PANTOPRAZOLE SODIUM 40 MG/1
40 TABLET, DELAYED RELEASE ORAL
Qty: 180 TABLET | Refills: 0 | Status: SHIPPED | OUTPATIENT
Start: 2024-12-09

## 2025-01-15 NOTE — PROGRESS NOTES
ADULT AUDIOLOGY EVALUATION    Name:  Karli Ferrera  :  1955  Age:  69 y.o.  Date of Evaluation:  2025     IMPRESSIONS     Otoscopy and screening tympanometry are consistent with occluding cerumen in the left ear. Otoscopy in the right ear demonstrated clear canal with intact tympanic membrane.    RECOMMENDATIONS     Schedule with ENT for cerumen removal in the left ear. The ENT department can be reached at [phone].  Following cerumen removal, return for hearing evaluation.    Time: 9341-6001    HISTORY     Karli Ferrera is seen today for an audiologic evaluation. Previous audiologic testing on 24 demonstrates moderate to moderately-severe sensorineural hearing loss in both ears. Word recognition was measured to be good in both ears at this time. Patient was fit with binaural Phonak Audeo P50-R TRICIA hearing aids through this clinic on 23. These devices are currently within their  warranty (exp: 3/4/2026).     HEARING AID INFORMATION       Right Left   Date of Fitting 2023   Clinic [facility]    Phonak Phonak   Model Audeo P50-R Audeo P50-R   Serial Number [id number]   Style TRICIA TRICIA   Warranty (Repair & L/D) 3/4/2026 3/4/2026   /Dome 2MP, slim tip earmold (SN: [id number], warranty exp: 3/4/23) 2MP, slip tip earmold (SN: [id number], warranty exp: 3/4/23)     Today, patient reports that her hearing seems to be significantly worse since her last evaluation. She has to turn the TV volume up very high in order to hear it, even with her hearing aids in. Besides insufficient volume, patient reports that her hearing aids have been working well. Patient denied tinnitus, dizziness, aural fullness, recent ear infections, otalgia, otorrhea, or history of otologic surgeries.      TEST RESULTS     Otoscopic Evaluation:  Right Ear: Clear ear canal with unremarkable tympanic membrane  Left Ear: Complete cerumen occlusion without view of  tympanic membrane    Screening Tympanometry:   Right Ear: Did not test.  Left Ear: Flat, type B tympanogram with small ear canal volume, no peak pressure or compliance, consistent with complete cerumen occlusion.      No charges associated with this encounter.    Patsy Carvalho, CCC-A  Clinical Audiologist    Degree of Hearing Sensitivity Decibel Range   Within Normal Limits (WNL) 0-25   Mild 26-40   Moderate 41-55   Moderately-Severe 56-70   Severe 71-90   Profound 91+      Key   CNT/DNT Could Not Test/Did Not Test   TM Tympanic Membrane   WNL Within Normal Limits   HA Hearing Aid   SNHL Sensorineural Hearing Loss   CHL Conductive Hearing Loss   NIHL Noise-Induced Hearing Loss   ECV Ear Canal Volume   MLV Monitored Live Voice     AUDIOGRAM

## 2025-01-20 ENCOUNTER — CLINICAL SUPPORT (OUTPATIENT)
Dept: AUDIOLOGY | Facility: CLINIC | Age: 70
End: 2025-01-20
Payer: MEDICARE

## 2025-01-20 DIAGNOSIS — H61.22 IMPACTED CERUMEN OF LEFT EAR: Primary | ICD-10-CM

## 2025-01-21 ENCOUNTER — TELEPHONE (OUTPATIENT)
Dept: PRIMARY CARE | Facility: CLINIC | Age: 70
End: 2025-01-21

## 2025-01-22 ENCOUNTER — OFFICE VISIT (OUTPATIENT)
Dept: PRIMARY CARE | Facility: CLINIC | Age: 70
End: 2025-01-22
Payer: MEDICARE

## 2025-01-22 DIAGNOSIS — H61.23 EXCESSIVE WAX IN BOTH EARS: Primary | ICD-10-CM

## 2025-01-22 PROCEDURE — 1123F ACP DISCUSS/DSCN MKR DOCD: CPT | Performed by: INTERNAL MEDICINE

## 2025-01-22 PROCEDURE — 99212 OFFICE O/P EST SF 10 MIN: CPT | Performed by: INTERNAL MEDICINE

## 2025-01-22 PROCEDURE — 1036F TOBACCO NON-USER: CPT | Performed by: INTERNAL MEDICINE

## 2025-01-22 PROCEDURE — G2211 COMPLEX E/M VISIT ADD ON: HCPCS | Performed by: INTERNAL MEDICINE

## 2025-01-22 PROCEDURE — 1160F RVW MEDS BY RX/DR IN RCRD: CPT | Performed by: INTERNAL MEDICINE

## 2025-01-22 PROCEDURE — 1159F MED LIST DOCD IN RCRD: CPT | Performed by: INTERNAL MEDICINE

## 2025-01-22 NOTE — ASSESSMENT & PLAN NOTE
After instilling hydrogen peroxide in both ears they were lavaged with warm water and wax was removed with the help of curette also.

## 2025-01-22 NOTE — PROGRESS NOTES
Subjective   Patient ID: Karli Ferrera is a 69 y.o. female who presents for EAR CLEANING (Bilateral).    HPI     Review of Systems   HENT:          Ears wax       Objective   There were no vitals taken for this visit.    Physical Exam  HENT:      Head:      Comments: Bilateral ears wax        Assessment/Plan   Problem List Items Addressed This Visit             ICD-10-CM    Excessive wax in both ears - Primary H61.23     After instilling hydrogen peroxide in both ears they were lavaged with warm water and wax was removed with the help of curette also.

## 2025-01-28 NOTE — PROGRESS NOTES
ADULT AUDIOLOGY EVALUATION    Name:  Karli Ferrera  :  1955  Age:  69 y.o.  Date of Evaluation:  2025     IMPRESSIONS     Today's test results indicate normal middle ear functioning in the right ear, and excessive negative middle ear pressure in the left ear. Audiometry shows moderate to moderately-severe sensorineural hearing loss in the right ear, and moderate to severe sensorineural hearing loss in the left ear. Word recognition is good in both ears. Binaural speech-in-noise testing demonstrated moderate difficulty understanding speech in the presence of background noise. Compared to previous audiologic evaluation on 24, hearing thresholds are largely stable, with slight progression observed at 500, 1000, and 8000 Hz in the left ear.     RECOMMENDATIONS     Continue medical follow up with PCP.  Consistent use of binaural hearing aids.  Return as needed for hearing aid checks and maintenance.  Return for annual hearing evaluation or sooner should new concerns arise. Patient was made aware of excessive negative middle ear pressure in the left ear and encouraged to follow up with this department if she begins to notice left sided fullness or decreased hearing abilities on this side.    Time: 9280-1896    HISTORY     Karli Ferrear is seen today for an audiologic evaluation in conjunction with otolaryngology. Patient was recently seen for testing on 25, however full testing was unable to be completed at this visit due to patient having excessive cerumen in the left ear. Previous audiologic testing on 24 demonstrates moderate to moderately-severe sensorineural hearing loss in both ears. Word recognition was measured to be good in both ears at this time. Patient was fit with binaural Phonak Meteo-Logiceo P50-R TRICIA hearing aids through this clinic on 23. These devices are currently within their  warranty (exp: 3/4/2026). Of note, hearing aids were briefly cleaned and checked at  previous visit on 1/20/25 and found to be in good working condition.     HEARING AID INFORMATION       Right Left   Date of Fitting 1/16/2023 1/16/2023   Clinic [facility]    Phonak Phonak   Model Audeo P50-R Audeo P50-R   Serial Number [id number]   [facility] TRICIA [facility]   Warranty (Repair & L/D) 3/4/2026 3/4/2026   /Dome 2MP, slim tip earmold (SN: [id number], warranty exp: 3/4/23) 2MP, slip tip earmold (SN: [id number], warranty exp: 3/4/23)      Case history obtained on 1/20/25:  Patient reports that her hearing seems to be significantly worse since her last evaluation. She has to turn the TV volume up very high in order to hear it, even with her hearing aids in. Besides insufficient volume, patient reports that her hearing aids have been working well. Patient denied tinnitus, dizziness, aural fullness, recent ear infections, otalgia, otorrhea, or history of otologic surgeries.    Today, patient reports that she recently had cerumen removed by her primary care provider. She feels that this helped her hearing significantly compared to her last visit. Karli denied any new concerns regarding her ears or hearing. She noted having recent congestion due to a cold.      TEST RESULTS     Otoscopic Evaluation:  Right Ear: Clear ear canal with unremarkable tympanic membrane  Left Ear: Non-occluding cerumen in ear canal with partial visualization of tympanic membrane    Tympanometry:   Right Ear: Normal, type A tympanogram with normal ear canal volume, peak pressure and compliance.   Left Ear: Negative, type C tympanogram with normal ear canal volume, negative peak pressure, and normal compliance.     Ipsilateral Acoustic Reflexes:   Right Ear: Could not test due to inability to maintain seal.   Left Ear: Could not test due to inability to maintain seal.     Pure Tone Audiometry (125-8000 Hz):     Right Ear: Moderate sensorineural hearing loss from 125-500 Hz, and moderately-severe from 8916-9995  Hz.    Left Ear: Borderline moderately-severe to moderate from 125-500 Hz, falling to moderately-severe from 4351-9031 Hz, and severe at 8000 Hz.    Speech Audiometry:   Right Ear:    Speech Reception Threshold (SRT) was obtained at 55 dBHL using recorded material.   Word Recognition scores were good (84%) in quiet when words were presented at 90 dBHL using recorded NU-6 word list ordered by difficulty.  Most Comfortable Level (MCL) was found to be 90 dBHL.  Left Ear:    Speech Reception Threshold (SRT) was obtained at 65 dBHL using recorded material.   Word Recognition scores were good (84%) in quiet when words were presented at 90 dBHL using recorded NU-6 word list ordered by difficulty.   Most Comfortable Level (MCL) was found to be 90 dBHL.    QuickSIN:  One QuickSIN list was delivered binaurally at 90 dBHL  Binaural: SNR loss of 11.5 (moderate)    Previous Test:  Right ear: Hearing thresholds are largely stable compared to previous test from 1/22/24.  Left ear: Hearing thresholds are largely stable compared to previous test from 1/22/24. Slight (10 dB) decline was observed at 500, 1000, and 8000 Hz.        Patsy Carvalho, CCC-A  Clinical Audiologist    Degree of Hearing Sensitivity Decibel Range   Within Normal Limits (WNL) 0-25   Mild 26-40   Moderate 41-55   Moderately-Severe 56-70   Severe 71-90   Profound 91+      Key   CNT/DNT Could Not Test/Did Not Test   TM Tympanic Membrane   WNL Within Normal Limits   HA Hearing Aid   SNHL Sensorineural Hearing Loss   CHL Conductive Hearing Loss   NIHL Noise-Induced Hearing Loss   ECV Ear Canal Volume   MLV Monitored Live Voice     AUDIOGRAM

## 2025-01-29 ENCOUNTER — CLINICAL SUPPORT (OUTPATIENT)
Dept: AUDIOLOGY | Facility: CLINIC | Age: 70
End: 2025-01-29
Payer: MEDICARE

## 2025-01-29 DIAGNOSIS — H90.3 SENSORINEURAL HEARING LOSS (SNHL) OF BOTH EARS: Primary | ICD-10-CM

## 2025-01-29 PROCEDURE — 92557 COMPREHENSIVE HEARING TEST: CPT

## 2025-01-29 PROCEDURE — 92567 TYMPANOMETRY: CPT

## 2025-02-10 ENCOUNTER — HOSPITAL ENCOUNTER (OUTPATIENT)
Dept: RADIOLOGY | Facility: EXTERNAL LOCATION | Age: 70
Discharge: HOME | End: 2025-02-10

## 2025-02-10 ENCOUNTER — APPOINTMENT (OUTPATIENT)
Dept: OBSTETRICS AND GYNECOLOGY | Facility: CLINIC | Age: 70
End: 2025-02-10
Payer: MEDICARE

## 2025-02-10 VITALS
DIASTOLIC BLOOD PRESSURE: 65 MMHG | WEIGHT: 155 LBS | SYSTOLIC BLOOD PRESSURE: 118 MMHG | HEIGHT: 65 IN | BODY MASS INDEX: 25.83 KG/M2

## 2025-02-10 DIAGNOSIS — Z01.419 NORMAL GYNECOLOGIC EXAMINATION: ICD-10-CM

## 2025-02-10 DIAGNOSIS — N95.2 VAGINAL ATROPHY: Primary | ICD-10-CM

## 2025-02-10 DIAGNOSIS — Z85.3 HISTORY OF BREAST CANCER: ICD-10-CM

## 2025-02-10 DIAGNOSIS — Z12.31 BREAST CANCER SCREENING BY MAMMOGRAM: ICD-10-CM

## 2025-02-10 DIAGNOSIS — Z12.31 ENCOUNTER FOR SCREENING MAMMOGRAM FOR MALIGNANT NEOPLASM OF BREAST: ICD-10-CM

## 2025-02-10 PROCEDURE — 1160F RVW MEDS BY RX/DR IN RCRD: CPT | Performed by: OBSTETRICS & GYNECOLOGY

## 2025-02-10 PROCEDURE — 1159F MED LIST DOCD IN RCRD: CPT | Performed by: OBSTETRICS & GYNECOLOGY

## 2025-02-10 PROCEDURE — 3074F SYST BP LT 130 MM HG: CPT | Performed by: OBSTETRICS & GYNECOLOGY

## 2025-02-10 PROCEDURE — 1036F TOBACCO NON-USER: CPT | Performed by: OBSTETRICS & GYNECOLOGY

## 2025-02-10 PROCEDURE — 1126F AMNT PAIN NOTED NONE PRSNT: CPT | Performed by: OBSTETRICS & GYNECOLOGY

## 2025-02-10 PROCEDURE — 3078F DIAST BP <80 MM HG: CPT | Performed by: OBSTETRICS & GYNECOLOGY

## 2025-02-10 PROCEDURE — 1123F ACP DISCUSS/DSCN MKR DOCD: CPT | Performed by: OBSTETRICS & GYNECOLOGY

## 2025-02-10 PROCEDURE — 99214 OFFICE O/P EST MOD 30 MIN: CPT | Performed by: OBSTETRICS & GYNECOLOGY

## 2025-02-10 PROCEDURE — 3008F BODY MASS INDEX DOCD: CPT | Performed by: OBSTETRICS & GYNECOLOGY

## 2025-02-10 RX ORDER — ESTRADIOL 0.1 MG/G
1 CREAM VAGINAL EVERY OTHER DAY
Qty: 45 G | Refills: 3 | Status: SHIPPED | OUTPATIENT
Start: 2025-02-10 | End: 2026-02-10

## 2025-02-10 ASSESSMENT — ENCOUNTER SYMPTOMS
EYES NEGATIVE: 1
PSYCHIATRIC NEGATIVE: 1
ENDOCRINE NEGATIVE: 1
MUSCULOSKELETAL NEGATIVE: 1
RESPIRATORY NEGATIVE: 1
NEUROLOGICAL NEGATIVE: 1
ALLERGIC/IMMUNOLOGIC NEGATIVE: 1
GASTROINTESTINAL NEGATIVE: 1
CARDIOVASCULAR NEGATIVE: 1
CONSTITUTIONAL NEGATIVE: 1
HEMATOLOGIC/LYMPHATIC NEGATIVE: 1

## 2025-02-10 ASSESSMENT — PAIN SCALES - GENERAL: PAINLEVEL_OUTOF10: 0-NO PAIN

## 2025-02-10 ASSESSMENT — PATIENT HEALTH QUESTIONNAIRE - PHQ9
1. LITTLE INTEREST OR PLEASURE IN DOING THINGS: NOT AT ALL
2. FEELING DOWN, DEPRESSED OR HOPELESS: NOT AT ALL
SUM OF ALL RESPONSES TO PHQ9 QUESTIONS 1 & 2: 0

## 2025-02-10 NOTE — PROGRESS NOTES
Subjective   Patient ID: Karli Ferrera is a 69 y.o. female who presents for Annual Exam (Last pap:  2/8/2024  neg/neg./Last chloe:  1/4/2024  cat 1./Last colon screen:  1/12/2024./Declines chaperone.   Emilie Carl LPN/).  HPI patient is here for annual visit she has no complaint    Review of Systems   Constitutional: Negative.    Eyes: Negative.    Respiratory: Negative.     Cardiovascular: Negative.    Gastrointestinal: Negative.    Endocrine: Negative.    Genitourinary: Negative.    Musculoskeletal: Negative.    Skin: Negative.    Allergic/Immunologic: Negative.    Neurological: Negative.    Hematological: Negative.    Psychiatric/Behavioral: Negative.         Objective   Physical Exam  Constitutional:       Appearance: Normal appearance.   HENT:      Head: Normocephalic and atraumatic.   Cardiovascular:      Rate and Rhythm: Normal rate and regular rhythm.      Pulses: Normal pulses.      Heart sounds: Normal heart sounds.   Pulmonary:      Effort: Pulmonary effort is normal.      Breath sounds: Normal breath sounds.   Abdominal:      General: Abdomen is flat. Bowel sounds are normal.      Palpations: Abdomen is soft.      Hernia: There is no hernia in the left inguinal area or right inguinal area.   Genitourinary:     General: Normal vulva.      Exam position: Lithotomy position.      Labia:         Right: No rash, tenderness or lesion.         Left: No rash, tenderness or lesion.       Urethra: No prolapse.      Cervix: Normal.      Uterus: Normal.       Adnexa: Right adnexa normal and left adnexa normal.      Comments: Vagina is atrophic and stenotic at the introitus   Musculoskeletal:      Cervical back: Normal range of motion and neck supple.   Skin:     General: Skin is warm and dry.   Neurological:      General: No focal deficit present.      Mental Status: She is alert and oriented to person, place, and time.         Assessment/Plan     Normal examination  Mammogram  Start using estradiol vaginal  cream 1 g into vagina every other day       Jesse Reddy MD 02/10/25 1:57 PM

## 2025-02-18 LAB
ALBUMIN SERPL-MCNC: 4.2 G/DL (ref 3.6–5.1)
ALP SERPL-CCNC: 66 U/L (ref 37–153)
ALT SERPL-CCNC: 11 U/L (ref 6–29)
ANION GAP SERPL CALCULATED.4IONS-SCNC: 8 MMOL/L (CALC) (ref 7–17)
AST SERPL-CCNC: 20 U/L (ref 10–35)
BILIRUB SERPL-MCNC: 0.6 MG/DL (ref 0.2–1.2)
BUN SERPL-MCNC: 20 MG/DL (ref 7–25)
CALCIUM SERPL-MCNC: 9.4 MG/DL (ref 8.6–10.4)
CHLORIDE SERPL-SCNC: 107 MMOL/L (ref 98–110)
CHOLEST SERPL-MCNC: 167 MG/DL
CHOLEST/HDLC SERPL: 3 (CALC)
CO2 SERPL-SCNC: 27 MMOL/L (ref 20–32)
CREAT SERPL-MCNC: 0.83 MG/DL (ref 0.5–1.05)
EGFRCR SERPLBLD CKD-EPI 2021: 76 ML/MIN/1.73M2
EST. AVERAGE GLUCOSE BLD GHB EST-MCNC: 134 MG/DL
EST. AVERAGE GLUCOSE BLD GHB EST-SCNC: 7.4 MMOL/L
GLUCOSE SERPL-MCNC: 124 MG/DL (ref 65–99)
HBA1C MFR BLD: 6.3 % OF TOTAL HGB
HDLC SERPL-MCNC: 55 MG/DL
LDLC SERPL CALC-MCNC: 99 MG/DL (CALC)
NONHDLC SERPL-MCNC: 112 MG/DL (CALC)
POTASSIUM SERPL-SCNC: 4.1 MMOL/L (ref 3.5–5.3)
PROT SERPL-MCNC: 6.5 G/DL (ref 6.1–8.1)
SODIUM SERPL-SCNC: 142 MMOL/L (ref 135–146)
TRIGL SERPL-MCNC: 51 MG/DL

## 2025-02-20 ENCOUNTER — APPOINTMENT (OUTPATIENT)
Dept: PRIMARY CARE | Facility: CLINIC | Age: 70
End: 2025-02-20
Payer: MEDICARE

## 2025-02-20 VITALS
BODY MASS INDEX: 26.63 KG/M2 | DIASTOLIC BLOOD PRESSURE: 62 MMHG | WEIGHT: 160 LBS | OXYGEN SATURATION: 92 % | HEART RATE: 74 BPM | SYSTOLIC BLOOD PRESSURE: 129 MMHG

## 2025-02-20 DIAGNOSIS — I10 ESSENTIAL (PRIMARY) HYPERTENSION: ICD-10-CM

## 2025-02-20 DIAGNOSIS — Z00.00 MEDICARE ANNUAL WELLNESS VISIT, SUBSEQUENT: Primary | ICD-10-CM

## 2025-02-20 DIAGNOSIS — R53.83 FATIGUE, UNSPECIFIED TYPE: ICD-10-CM

## 2025-02-20 DIAGNOSIS — R73.09 ABNORMAL GLUCOSE: ICD-10-CM

## 2025-02-20 PROBLEM — R05.3 PERSISTENT COUGH: Status: RESOLVED | Noted: 2023-10-28 | Resolved: 2025-02-20

## 2025-02-20 PROCEDURE — 1159F MED LIST DOCD IN RCRD: CPT | Performed by: INTERNAL MEDICINE

## 2025-02-20 PROCEDURE — 3074F SYST BP LT 130 MM HG: CPT | Performed by: INTERNAL MEDICINE

## 2025-02-20 PROCEDURE — 1170F FXNL STATUS ASSESSED: CPT | Performed by: INTERNAL MEDICINE

## 2025-02-20 PROCEDURE — 99213 OFFICE O/P EST LOW 20 MIN: CPT | Performed by: INTERNAL MEDICINE

## 2025-02-20 PROCEDURE — G0439 PPPS, SUBSEQ VISIT: HCPCS | Performed by: INTERNAL MEDICINE

## 2025-02-20 PROCEDURE — 3078F DIAST BP <80 MM HG: CPT | Performed by: INTERNAL MEDICINE

## 2025-02-20 PROCEDURE — 1123F ACP DISCUSS/DSCN MKR DOCD: CPT | Performed by: INTERNAL MEDICINE

## 2025-02-20 PROCEDURE — 1126F AMNT PAIN NOTED NONE PRSNT: CPT | Performed by: INTERNAL MEDICINE

## 2025-02-20 PROCEDURE — 1036F TOBACCO NON-USER: CPT | Performed by: INTERNAL MEDICINE

## 2025-02-20 RX ORDER — AMLODIPINE AND BENAZEPRIL HYDROCHLORIDE 5; 20 MG/1; MG/1
1 CAPSULE ORAL DAILY
Qty: 90 CAPSULE | Refills: 1 | Status: SHIPPED | OUTPATIENT
Start: 2025-02-20

## 2025-02-20 ASSESSMENT — ENCOUNTER SYMPTOMS
LOSS OF SENSATION IN FEET: 0
OCCASIONAL FEELINGS OF UNSTEADINESS: 0
DEPRESSION: 0

## 2025-02-20 ASSESSMENT — ACTIVITIES OF DAILY LIVING (ADL)
DOING_HOUSEWORK: INDEPENDENT
GROCERY_SHOPPING: INDEPENDENT
BATHING: INDEPENDENT
MANAGING_FINANCES: INDEPENDENT
TAKING_MEDICATION: INDEPENDENT
DRESSING: INDEPENDENT

## 2025-02-20 ASSESSMENT — PAIN SCALES - GENERAL: PAINLEVEL_OUTOF10: 0-NO PAIN

## 2025-02-20 NOTE — ASSESSMENT & PLAN NOTE
HbA1c 6.3.  Made aware of risk of diabetes.  Advised to follow low carbohydrate diet avoid rice potatoes pasta, sweets, sweet drinks.  Increase physical activity.

## 2025-02-20 NOTE — ASSESSMENT & PLAN NOTE
He is up-to-date with vaccinations except Adacel vaccine I recommended to have it at the local pharmacy.  She has had screening mammogram February this year.  Her last bone density scan October 2024.  Last screening Pap test February 2024.  Last colonoscopy January 2024.  She wears hearing aids.  Made aware of risk of diabetes, advised to follow low carbohydrate diet.  Increase physical activity.  Follow-up in 6 months.

## 2025-02-20 NOTE — PROGRESS NOTES
Subjective   Patient ID: Karli Ferrera is a 69 y.o. female who presents for Medicare Annual Wellness Visit Subsequent.    Subsequent Medicare wellness visit.  She has hypertension, history of depression and anxiety, prediabetes, GERD.  She needs refills on antihypertensive medication.  She wears hearing aids.  No complains.         Review of Systems   HENT:  Positive for hearing loss.    All other systems reviewed and are negative.      Objective   /62 (BP Location: Right arm, Patient Position: Sitting)   Pulse 74   Wt 72.6 kg (160 lb)   SpO2 92%   BMI 26.63 kg/m²     Physical Exam  Constitutional:       Appearance: Normal appearance.   HENT:      Head: Normocephalic and atraumatic.      Right Ear: External ear normal.      Left Ear: External ear normal.      Ears:      Comments: Lt. Ear wax.     Mouth/Throat:      Mouth: Mucous membranes are moist.      Pharynx: Oropharynx is clear.   Neck:      Vascular: No carotid bruit.   Cardiovascular:      Rate and Rhythm: Normal rate and regular rhythm.      Heart sounds: No murmur heard.     No gallop.   Pulmonary:      Effort: Pulmonary effort is normal. No respiratory distress.      Breath sounds: No wheezing or rales.   Abdominal:      General: Abdomen is flat.      Palpations: Abdomen is soft.      Hernia: No hernia is present.   Musculoskeletal:         General: No swelling, tenderness, deformity or signs of injury.      Cervical back: No rigidity or tenderness.      Right lower leg: No edema.   Lymphadenopathy:      Cervical: No cervical adenopathy.   Skin:     Coloration: Skin is not jaundiced or pale.      Findings: No bruising, erythema, lesion or rash.   Neurological:      General: No focal deficit present.      Mental Status: She is oriented to person, place, and time.      Motor: No weakness.      Coordination: Coordination normal.   Psychiatric:         Mood and Affect: Mood normal.         Behavior: Behavior normal.         Assessment/Plan    Problem List Items Addressed This Visit             ICD-10-CM    Abnormal glucose R73.09     HbA1c 6.3.  Made aware of risk of diabetes.  Advised to follow low carbohydrate diet avoid rice potatoes pasta, sweets, sweet drinks.  Increase physical activity.         Essential (primary) hypertension I10    Relevant Medications    amLODIPine-benazepriL (Lotrel) 5-20 mg capsule    Medicare annual wellness visit, subsequent - Primary Z00.00     He is up-to-date with vaccinations except Adacel vaccine I recommended to have it at the local pharmacy.  She has had screening mammogram February this year.  Her last bone density scan October 2024.  Last screening Pap test February 2024.  Last colonoscopy January 2024.  She wears hearing aids.  Made aware of risk of diabetes, advised to follow low carbohydrate diet.  Increase physical activity.  Follow-up in 6 months.

## 2025-03-04 DIAGNOSIS — K21.00 GASTROESOPHAGEAL REFLUX DISEASE WITH ESOPHAGITIS WITHOUT HEMORRHAGE: Primary | ICD-10-CM

## 2025-03-04 RX ORDER — FAMOTIDINE 40 MG/1
40 TABLET, FILM COATED ORAL DAILY
Qty: 90 TABLET | Refills: 3 | Status: SHIPPED | OUTPATIENT
Start: 2025-03-04 | End: 2026-03-04

## 2025-03-05 DIAGNOSIS — K44.9 DIAPHRAGMATIC HERNIA WITHOUT OBSTRUCTION OR GANGRENE: ICD-10-CM

## 2025-03-06 RX ORDER — PANTOPRAZOLE SODIUM 40 MG/1
TABLET, DELAYED RELEASE ORAL
Qty: 180 TABLET | Refills: 0 | Status: SHIPPED | OUTPATIENT
Start: 2025-03-06

## 2025-03-13 ENCOUNTER — APPOINTMENT (OUTPATIENT)
Dept: GASTROENTEROLOGY | Facility: CLINIC | Age: 70
End: 2025-03-13
Payer: MEDICARE

## 2025-03-13 VITALS — WEIGHT: 153 LBS | HEART RATE: 97 BPM | OXYGEN SATURATION: 97 % | HEIGHT: 65 IN | BODY MASS INDEX: 25.49 KG/M2

## 2025-03-13 DIAGNOSIS — K44.9 HIATAL HERNIA: ICD-10-CM

## 2025-03-13 DIAGNOSIS — K21.00 GASTROESOPHAGEAL REFLUX DISEASE WITH ESOPHAGITIS WITHOUT HEMORRHAGE: Primary | ICD-10-CM

## 2025-03-13 DIAGNOSIS — K21.00 GASTROESOPHAGEAL REFLUX DISEASE WITH ESOPHAGITIS WITHOUT HEMORRHAGE: ICD-10-CM

## 2025-03-13 PROCEDURE — 1123F ACP DISCUSS/DSCN MKR DOCD: CPT

## 2025-03-13 PROCEDURE — 1159F MED LIST DOCD IN RCRD: CPT

## 2025-03-13 PROCEDURE — 3008F BODY MASS INDEX DOCD: CPT

## 2025-03-13 PROCEDURE — 99213 OFFICE O/P EST LOW 20 MIN: CPT

## 2025-03-13 RX ORDER — FAMOTIDINE 40 MG/1
40 TABLET, FILM COATED ORAL DAILY
Qty: 90 TABLET | Refills: 3 | Status: SHIPPED | OUTPATIENT
Start: 2025-03-13 | End: 2026-03-13

## 2025-03-13 RX ORDER — HYDROGEN PEROXIDE 3 %
20 SOLUTION, NON-ORAL MISCELLANEOUS
Qty: 30 CAPSULE | Refills: 11 | Status: SHIPPED | OUTPATIENT
Start: 2025-03-13 | End: 2026-03-13

## 2025-03-13 RX ORDER — LANSOPRAZOLE 30 MG/1
30 CAPSULE, DELAYED RELEASE ORAL
Qty: 180 CAPSULE | Refills: 1 | Status: SHIPPED | OUTPATIENT
Start: 2025-03-13 | End: 2026-03-13

## 2025-03-13 ASSESSMENT — ENCOUNTER SYMPTOMS
SHORTNESS OF BREATH: 0
DIARRHEA: 0
COUGH: 1
TROUBLE SWALLOWING: 0
CHILLS: 0
NAUSEA: 0
ABDOMINAL DISTENTION: 0
RECTAL PAIN: 0
FATIGUE: 0
ABDOMINAL PAIN: 0
CONSTIPATION: 0
BLOOD IN STOOL: 0
ANAL BLEEDING: 0
APPETITE CHANGE: 0
VOMITING: 0
FEVER: 0

## 2025-03-13 NOTE — PATIENT INSTRUCTIONS
You may continue your famotidine/pepcid.  Please stop twice daily pantoprazole/protonix and start Lansoprazole/prevacid instead.  Update us in 2 months.  We will consider an upper endoscopy if you are not better

## 2025-03-13 NOTE — PROGRESS NOTES
Subjective     History of Present Illness:   Karli Ferrera is a 69 y.o. female with PMHx of OCD, HTN, GERD who presents to GI clinic for medication refills    Today, patient here acid reflux on 40 mg protonix and 40 mg pepcid twice daily.  Still coughing a lot from it, waking her up sometimes.  Recently lost her spouse, so that seems to be exacerbating her acid reflux.  She denies any other GI complaints today  Denies constipation, diarrhea, melena, hematochezia, dysphagia, unintentional weight loss    Denies ETOH, smoking, marijuana  Denies fxh GI cancer or IBD  Abdominal Surgeries: Denies    Last colonoscopy 1/2024 Dr. Buck for screening: Fair prep, 4 mm TA hepatic flexure 4 mm Schwann cell hamartoma sigmoid, diverticulosis.  Repeat 5 years.  Pathology: No metaplasia  Last EGD 9/2022 Dr. Buck for follow-up of reflux esophagitis: Grade B reflux esophagitis, 4 cm hiatal hernia.  Pathology: Negative H. pylori      Past Medical History  As per HPI.     Social History  she  reports that she has never smoked. She has never used smokeless tobacco. She reports that she does not currently use alcohol. She reports that she does not use drugs.     Family History  her family history includes Bone cancer in her father; Lung cancer in her father.     Review of Systems  Review of Systems   Constitutional:  Negative for appetite change, chills, fatigue and fever.   HENT:  Negative for trouble swallowing.    Respiratory:  Positive for cough. Negative for shortness of breath.    Gastrointestinal:  Negative for abdominal distention, abdominal pain, anal bleeding, blood in stool, constipation, diarrhea, nausea, rectal pain and vomiting.       Allergies  Allergies   Allergen Reactions    Pollen Extracts Hives       Medications  Current Outpatient Medications   Medication Instructions    amLODIPine-benazepriL (Lotrel) 5-20 mg capsule 1 capsule, oral, Daily    besifloxacin (Besivance) 0.6 % drops,suspension Use 1 Drop in the  left eye three times daily. STARTING AFTER SURGERY    bromfenac (Prolensa) 0.07 % drops Use 1 Drop in the left eye once daily. STARTING AFTER SURGERY    desvenlafaxine (PRISTIQ) 50 mg, Daily    diclofenac (Voltaren) 0.1 % ophthalmic solution     dorzolamide-timoloL (Cosopt) 22.3-6.8 mg/mL ophthalmic solution     dorzolamide-timolol, PF, (Cosopt) 2-0.5 % ophthalmic solution 1 drop, 2 times daily    famotidine (PEPCID) 40 mg, oral, Daily    FLUoxetine (PROZAC) 20 mg, Daily    lansoprazole (PREVACID) 30 mg, oral, 2 times daily before meals, Do not crush or chew.        Objective   Visit Vitals  Pulse 97      Physical Exam  Constitutional:       Appearance: Normal appearance. She is normal weight.   HENT:      Mouth/Throat:      Mouth: Mucous membranes are dry.      Pharynx: Oropharynx is clear.   Cardiovascular:      Rate and Rhythm: Normal rate and regular rhythm.   Pulmonary:      Effort: Pulmonary effort is normal.      Breath sounds: Normal breath sounds. No wheezing or rhonchi.   Abdominal:      General: Abdomen is flat. Bowel sounds are normal. There is no distension.      Palpations: Abdomen is soft. There is no hepatomegaly.      Tenderness: There is no abdominal tenderness. There is no guarding or rebound. Negative signs include Laughlin's sign.      Hernia: No hernia is present.   Musculoskeletal:         General: Normal range of motion.   Skin:     General: Skin is warm and dry.   Neurological:      General: No focal deficit present.      Mental Status: She is alert and oriented to person, place, and time.   Psychiatric:         Mood and Affect: Mood normal.         Behavior: Behavior normal.           Lab Results   Component Value Date    WBC 7.2 02/01/2024    WBC 7.3 08/09/2023    WBC 6.9 02/10/2022    HGB 12.0 02/01/2024    HGB 11.8 (L) 08/09/2023    HGB 12.8 02/10/2022    HCT 39.0 02/01/2024    HCT 39.5 08/09/2023    HCT 40.7 02/10/2022     02/01/2024     08/09/2023     02/10/2022      Lab Results   Component Value Date     02/17/2025     02/01/2024     08/09/2023    K 4.1 02/17/2025    K 4.2 02/01/2024    K 4.1 08/09/2023     02/17/2025     02/01/2024     08/09/2023    CO2 27 02/17/2025    CO2 30 02/01/2024    CO2 29 08/09/2023    BUN 20 02/17/2025    BUN 17 02/01/2024    BUN 20 08/09/2023    CREATININE 0.83 02/17/2025    CREATININE 0.95 02/01/2024    CREATININE 0.89 08/09/2023    CALCIUM 9.4 02/17/2025    CALCIUM 9.6 02/01/2024    CALCIUM 9.8 08/09/2023    PROT 6.5 02/17/2025    PROT 6.6 02/01/2024    PROT 6.7 08/09/2023    BILITOT 0.6 02/17/2025    BILITOT 0.5 02/01/2024    BILITOT 0.7 08/09/2023    ALKPHOS 66 02/17/2025    ALKPHOS 63 02/01/2024    ALKPHOS 64 08/09/2023    ALT 11 02/17/2025    ALT 12 02/01/2024    ALT 12 08/09/2023    AST 20 02/17/2025    AST 17 02/01/2024    AST 19 08/09/2023    GLUCOSE 124 (H) 02/17/2025    GLUCOSE 95 02/01/2024    GLUCOSE 107 (H) 08/09/2023           Karli Ferrera is a 69 y.o. female who presents to GI clinic for uncontrolled GERD.    GERD with esophagitis  Uncontrolled GERD  -Stop Protonix and start 30 mg twice daily Prevacid  -Continue 40 mg daily Pepcid  -Consider repeat EGD if not better in 2 months versus dose decrease.  Patient agrees to provide update of effectiveness in 2 months         Ana Liao, APRN-CNP

## 2025-03-13 NOTE — ASSESSMENT & PLAN NOTE
Uncontrolled GERD  -Stop Protonix and start 30 mg twice daily Prevacid  -Continue 40 mg daily Pepcid  -Consider repeat EGD if not better in 2 months versus dose decrease.  Patient agrees to provide update of effectiveness in 2 months

## 2025-03-26 ENCOUNTER — APPOINTMENT (OUTPATIENT)
Dept: OTOLARYNGOLOGY | Facility: CLINIC | Age: 70
End: 2025-03-26
Payer: MEDICARE

## 2025-08-20 DIAGNOSIS — R73.09 ABNORMAL GLUCOSE: ICD-10-CM

## 2025-08-20 DIAGNOSIS — I10 ESSENTIAL (PRIMARY) HYPERTENSION: ICD-10-CM

## 2025-08-20 DIAGNOSIS — R53.83 FATIGUE, UNSPECIFIED TYPE: ICD-10-CM

## 2025-08-21 ENCOUNTER — APPOINTMENT (OUTPATIENT)
Dept: PRIMARY CARE | Facility: CLINIC | Age: 70
End: 2025-08-21
Payer: MEDICARE

## 2025-08-21 VITALS
RESPIRATION RATE: 17 BRPM | SYSTOLIC BLOOD PRESSURE: 111 MMHG | DIASTOLIC BLOOD PRESSURE: 84 MMHG | BODY MASS INDEX: 25.61 KG/M2 | HEART RATE: 108 BPM | WEIGHT: 153.88 LBS | OXYGEN SATURATION: 95 %

## 2025-08-21 DIAGNOSIS — E78.49 FAMILIAL HYPERLIPIDEMIA, HIGH LDL: ICD-10-CM

## 2025-08-21 DIAGNOSIS — R73.09 ABNORMAL GLUCOSE: ICD-10-CM

## 2025-08-21 DIAGNOSIS — I10 ESSENTIAL (PRIMARY) HYPERTENSION: Primary | ICD-10-CM

## 2025-08-21 LAB
ANION GAP SERPL CALCULATED.4IONS-SCNC: 12 MMOL/L (CALC) (ref 7–17)
BASOPHILS # BLD AUTO: 88 CELLS/UL (ref 0–200)
BASOPHILS NFR BLD AUTO: 1 %
BUN SERPL-MCNC: 17 MG/DL (ref 7–25)
BUN/CREAT SERPL: ABNORMAL (CALC) (ref 6–22)
CALCIUM SERPL-MCNC: 9.4 MG/DL (ref 8.6–10.4)
CHLORIDE SERPL-SCNC: 105 MMOL/L (ref 98–110)
CO2 SERPL-SCNC: 26 MMOL/L (ref 20–32)
CREAT SERPL-MCNC: 0.89 MG/DL (ref 0.5–1.05)
EGFRCR SERPLBLD CKD-EPI 2021: 70 ML/MIN/1.73M2
EOSINOPHIL # BLD AUTO: 466 CELLS/UL (ref 15–500)
EOSINOPHIL NFR BLD AUTO: 5.3 %
ERYTHROCYTE [DISTWIDTH] IN BLOOD BY AUTOMATED COUNT: 12.3 % (ref 11–15)
EST. AVERAGE GLUCOSE BLD GHB EST-MCNC: 134 MG/DL
EST. AVERAGE GLUCOSE BLD GHB EST-SCNC: 7.4 MMOL/L
GLUCOSE SERPL-MCNC: 112 MG/DL (ref 65–99)
HBA1C MFR BLD: 6.3 %
HCT VFR BLD AUTO: 38.9 % (ref 35–45)
HGB BLD-MCNC: 12.3 G/DL (ref 11.7–15.5)
LYMPHOCYTES # BLD AUTO: 2631 CELLS/UL (ref 850–3900)
LYMPHOCYTES NFR BLD AUTO: 29.9 %
MCH RBC QN AUTO: 29.4 PG (ref 27–33)
MCHC RBC AUTO-ENTMCNC: 31.6 G/DL (ref 32–36)
MCV RBC AUTO: 92.8 FL (ref 80–100)
MONOCYTES # BLD AUTO: 590 CELLS/UL (ref 200–950)
MONOCYTES NFR BLD AUTO: 6.7 %
NEUTROPHILS # BLD AUTO: 5025 CELLS/UL (ref 1500–7800)
NEUTROPHILS NFR BLD AUTO: 57.1 %
PLATELET # BLD AUTO: 335 THOUSAND/UL (ref 140–400)
PMV BLD REES-ECKER: 9.2 FL (ref 7.5–12.5)
POTASSIUM SERPL-SCNC: 4.3 MMOL/L (ref 3.5–5.3)
RBC # BLD AUTO: 4.19 MILLION/UL (ref 3.8–5.1)
SODIUM SERPL-SCNC: 143 MMOL/L (ref 135–146)
WBC # BLD AUTO: 8.8 THOUSAND/UL (ref 3.8–10.8)

## 2025-08-21 PROCEDURE — 1160F RVW MEDS BY RX/DR IN RCRD: CPT | Performed by: INTERNAL MEDICINE

## 2025-08-21 PROCEDURE — 1126F AMNT PAIN NOTED NONE PRSNT: CPT | Performed by: INTERNAL MEDICINE

## 2025-08-21 PROCEDURE — 1159F MED LIST DOCD IN RCRD: CPT | Performed by: INTERNAL MEDICINE

## 2025-08-21 PROCEDURE — 3079F DIAST BP 80-89 MM HG: CPT | Performed by: INTERNAL MEDICINE

## 2025-08-21 PROCEDURE — 3074F SYST BP LT 130 MM HG: CPT | Performed by: INTERNAL MEDICINE

## 2025-08-21 PROCEDURE — G2211 COMPLEX E/M VISIT ADD ON: HCPCS | Performed by: INTERNAL MEDICINE

## 2025-08-21 PROCEDURE — 99213 OFFICE O/P EST LOW 20 MIN: CPT | Performed by: INTERNAL MEDICINE

## 2025-08-21 ASSESSMENT — PATIENT HEALTH QUESTIONNAIRE - PHQ9
1. LITTLE INTEREST OR PLEASURE IN DOING THINGS: NOT AT ALL
2. FEELING DOWN, DEPRESSED OR HOPELESS: NOT AT ALL
SUM OF ALL RESPONSES TO PHQ9 QUESTIONS 1 AND 2: 0

## 2025-08-21 ASSESSMENT — ENCOUNTER SYMPTOMS
OCCASIONAL FEELINGS OF UNSTEADINESS: 0
DEPRESSION: 0
LOSS OF SENSATION IN FEET: 0

## 2025-08-21 ASSESSMENT — PAIN SCALES - GENERAL: PAINLEVEL_OUTOF10: 0-NO PAIN

## 2026-02-12 ENCOUNTER — APPOINTMENT (OUTPATIENT)
Dept: OBSTETRICS AND GYNECOLOGY | Facility: CLINIC | Age: 71
End: 2026-02-12
Payer: MEDICARE

## 2026-02-19 ENCOUNTER — APPOINTMENT (OUTPATIENT)
Dept: PRIMARY CARE | Facility: CLINIC | Age: 71
End: 2026-02-19
Payer: MEDICARE